# Patient Record
Sex: FEMALE | Race: WHITE | NOT HISPANIC OR LATINO | Employment: OTHER | URBAN - METROPOLITAN AREA
[De-identification: names, ages, dates, MRNs, and addresses within clinical notes are randomized per-mention and may not be internally consistent; named-entity substitution may affect disease eponyms.]

---

## 2017-01-12 ENCOUNTER — FOLLOW UP AND POST INJECTION EVALUATION (OUTPATIENT)
Dept: URBAN - METROPOLITAN AREA CLINIC 26 | Facility: CLINIC | Age: 77
End: 2017-01-12

## 2017-01-12 VITALS — SYSTOLIC BLOOD PRESSURE: 129 MMHG | HEART RATE: 82 BPM | HEIGHT: 55 IN | DIASTOLIC BLOOD PRESSURE: 74 MMHG

## 2017-01-12 DIAGNOSIS — H40.041: ICD-10-CM

## 2017-01-12 DIAGNOSIS — H35.62: ICD-10-CM

## 2017-01-12 DIAGNOSIS — H40.9: ICD-10-CM

## 2017-01-12 DIAGNOSIS — H43.393: ICD-10-CM

## 2017-01-12 DIAGNOSIS — H34.8110: ICD-10-CM

## 2017-01-12 DIAGNOSIS — H02.836: ICD-10-CM

## 2017-01-12 DIAGNOSIS — H35.371: ICD-10-CM

## 2017-01-12 DIAGNOSIS — H02.833: ICD-10-CM

## 2017-01-12 DIAGNOSIS — H47.323: ICD-10-CM

## 2017-01-12 DIAGNOSIS — H35.3132: ICD-10-CM

## 2017-01-12 PROCEDURE — 92014 COMPRE OPH EXAM EST PT 1/>: CPT

## 2017-01-12 PROCEDURE — 92134 CPTRZ OPH DX IMG PST SGM RTA: CPT

## 2017-01-12 PROCEDURE — 2019F DILATED MACUL EXAM DONE: CPT

## 2017-01-12 PROCEDURE — 67028 INJECTION EYE DRUG: CPT

## 2017-01-12 PROCEDURE — 4177F TALK PT/CRGVR RE AREDS PREV: CPT

## 2017-01-12 PROCEDURE — 1036F TOBACCO NON-USER: CPT

## 2017-01-12 ASSESSMENT — TONOMETRY
OD_IOP_MMHG: 17
OS_IOP_MMHG: 12

## 2017-01-12 ASSESSMENT — VISUAL ACUITY
OS_SC: 20/25
OD_SC: 20/200-1

## 2017-02-27 ENCOUNTER — FOLLOW UP AND POST INJECTION EVALUATION (OUTPATIENT)
Dept: URBAN - METROPOLITAN AREA CLINIC 26 | Facility: CLINIC | Age: 77
End: 2017-02-27

## 2017-02-27 DIAGNOSIS — H34.8110: ICD-10-CM

## 2017-02-27 DIAGNOSIS — H47.323: ICD-10-CM

## 2017-02-27 DIAGNOSIS — H02.836: ICD-10-CM

## 2017-02-27 DIAGNOSIS — H35.62: ICD-10-CM

## 2017-02-27 DIAGNOSIS — H35.3132: ICD-10-CM

## 2017-02-27 DIAGNOSIS — H35.371: ICD-10-CM

## 2017-02-27 DIAGNOSIS — H43.393: ICD-10-CM

## 2017-02-27 DIAGNOSIS — H40.9: ICD-10-CM

## 2017-02-27 DIAGNOSIS — H02.833: ICD-10-CM

## 2017-02-27 DIAGNOSIS — H40.041: ICD-10-CM

## 2017-02-27 PROCEDURE — 1036F TOBACCO NON-USER: CPT

## 2017-02-27 PROCEDURE — 2019F DILATED MACUL EXAM DONE: CPT

## 2017-02-27 PROCEDURE — 92014 COMPRE OPH EXAM EST PT 1/>: CPT

## 2017-02-27 PROCEDURE — 4177F TALK PT/CRGVR RE AREDS PREV: CPT

## 2017-02-27 PROCEDURE — 67028 INJECTION EYE DRUG: CPT

## 2017-02-27 PROCEDURE — 92250 FUNDUS PHOTOGRAPHY W/I&R: CPT

## 2017-02-27 ASSESSMENT — TONOMETRY
OD_IOP_MMHG: 11
OS_IOP_MMHG: 12

## 2017-02-27 ASSESSMENT — VISUAL ACUITY
OD_SC: 20/300
OS_SC: 20/25

## 2017-03-30 ENCOUNTER — FOLLOW UP AND POST INJECTION EVALUATION (OUTPATIENT)
Dept: URBAN - METROPOLITAN AREA CLINIC 26 | Facility: CLINIC | Age: 77
End: 2017-03-30

## 2017-03-30 DIAGNOSIS — H40.9: ICD-10-CM

## 2017-03-30 DIAGNOSIS — H47.323: ICD-10-CM

## 2017-03-30 DIAGNOSIS — H43.393: ICD-10-CM

## 2017-03-30 DIAGNOSIS — H34.8110: ICD-10-CM

## 2017-03-30 DIAGNOSIS — H35.371: ICD-10-CM

## 2017-03-30 DIAGNOSIS — H35.3132: ICD-10-CM

## 2017-03-30 DIAGNOSIS — H40.041: ICD-10-CM

## 2017-03-30 DIAGNOSIS — H35.62: ICD-10-CM

## 2017-03-30 PROCEDURE — 92134 CPTRZ OPH DX IMG PST SGM RTA: CPT

## 2017-03-30 PROCEDURE — 67028 INJECTION EYE DRUG: CPT

## 2017-03-30 PROCEDURE — G8427 DOCREV CUR MEDS BY ELIG CLIN: HCPCS

## 2017-03-30 PROCEDURE — 4177F TALK PT/CRGVR RE AREDS PREV: CPT

## 2017-03-30 PROCEDURE — 92014 COMPRE OPH EXAM EST PT 1/>: CPT

## 2017-03-30 PROCEDURE — 1036F TOBACCO NON-USER: CPT

## 2017-03-30 PROCEDURE — 2019F DILATED MACUL EXAM DONE: CPT

## 2017-03-30 ASSESSMENT — TONOMETRY
OS_IOP_MMHG: 10
OD_IOP_MMHG: 14

## 2017-03-30 ASSESSMENT — VISUAL ACUITY
OS_SC: 20/25-2
OD_SC: 20/200

## 2017-05-01 ENCOUNTER — FOLLOW UP AND POST INJECTION EVALUATION (OUTPATIENT)
Dept: URBAN - METROPOLITAN AREA CLINIC 26 | Facility: CLINIC | Age: 77
End: 2017-05-01

## 2017-05-01 VITALS — HEART RATE: 73 BPM | DIASTOLIC BLOOD PRESSURE: 83 MMHG | SYSTOLIC BLOOD PRESSURE: 143 MMHG | HEIGHT: 55 IN

## 2017-05-01 DIAGNOSIS — H35.371: ICD-10-CM

## 2017-05-01 DIAGNOSIS — H40.041: ICD-10-CM

## 2017-05-01 DIAGNOSIS — H43.393: ICD-10-CM

## 2017-05-01 DIAGNOSIS — H34.8110: ICD-10-CM

## 2017-05-01 DIAGNOSIS — H35.62: ICD-10-CM

## 2017-05-01 DIAGNOSIS — H47.323: ICD-10-CM

## 2017-05-01 DIAGNOSIS — H40.9: ICD-10-CM

## 2017-05-01 DIAGNOSIS — H35.3132: ICD-10-CM

## 2017-05-01 PROCEDURE — 1036F TOBACCO NON-USER: CPT

## 2017-05-01 PROCEDURE — 92014 COMPRE OPH EXAM EST PT 1/>: CPT

## 2017-05-01 PROCEDURE — G8427 DOCREV CUR MEDS BY ELIG CLIN: HCPCS

## 2017-05-01 PROCEDURE — 2019F DILATED MACUL EXAM DONE: CPT

## 2017-05-01 PROCEDURE — 4177F TALK PT/CRGVR RE AREDS PREV: CPT

## 2017-05-01 PROCEDURE — 92250 FUNDUS PHOTOGRAPHY W/I&R: CPT

## 2017-05-01 PROCEDURE — 67028 INJECTION EYE DRUG: CPT

## 2017-05-01 ASSESSMENT — VISUAL ACUITY
OS_CC: 20/25-1
OD_CC: 20/400

## 2017-05-01 ASSESSMENT — TONOMETRY
OS_IOP_MMHG: 14
OD_IOP_MMHG: 19

## 2018-01-17 ENCOUNTER — FOLLOW UP AND POST INJECTION EVALUATION (OUTPATIENT)
Dept: URBAN - METROPOLITAN AREA CLINIC 26 | Facility: CLINIC | Age: 78
End: 2018-01-17

## 2018-01-17 VITALS — BODY MASS INDEX: 39.82 KG/M2 | HEIGHT: 65 IN | WEIGHT: 239 LBS

## 2018-01-17 DIAGNOSIS — H35.371: ICD-10-CM

## 2018-01-17 DIAGNOSIS — H35.3132: ICD-10-CM

## 2018-01-17 DIAGNOSIS — H40.041: ICD-10-CM

## 2018-01-17 DIAGNOSIS — H35.62: ICD-10-CM

## 2018-01-17 DIAGNOSIS — H47.323: ICD-10-CM

## 2018-01-17 DIAGNOSIS — H02.836: ICD-10-CM

## 2018-01-17 DIAGNOSIS — H34.8110: ICD-10-CM

## 2018-01-17 DIAGNOSIS — H02.833: ICD-10-CM

## 2018-01-17 DIAGNOSIS — H43.393: ICD-10-CM

## 2018-01-17 DIAGNOSIS — H04.123: ICD-10-CM

## 2018-01-17 DIAGNOSIS — H40.9: ICD-10-CM

## 2018-01-17 PROCEDURE — 92014 COMPRE OPH EXAM EST PT 1/>: CPT

## 2018-01-17 PROCEDURE — 92250 FUNDUS PHOTOGRAPHY W/I&R: CPT

## 2018-01-17 ASSESSMENT — VISUAL ACUITY
OD_SC: 20/100
OS_SC: 20/20-1

## 2018-01-17 ASSESSMENT — TONOMETRY
OD_IOP_MMHG: 16
OS_IOP_MMHG: 14

## 2018-03-06 ENCOUNTER — FOLLOW UP (OUTPATIENT)
Dept: URBAN - METROPOLITAN AREA CLINIC 26 | Facility: CLINIC | Age: 78
End: 2018-03-06

## 2018-03-06 VITALS — SYSTOLIC BLOOD PRESSURE: 118 MMHG | HEIGHT: 55 IN | DIASTOLIC BLOOD PRESSURE: 78 MMHG

## 2018-03-06 DIAGNOSIS — H35.371: ICD-10-CM

## 2018-03-06 DIAGNOSIS — H43.393: ICD-10-CM

## 2018-03-06 DIAGNOSIS — H40.9: ICD-10-CM

## 2018-03-06 DIAGNOSIS — H35.62: ICD-10-CM

## 2018-03-06 DIAGNOSIS — H47.323: ICD-10-CM

## 2018-03-06 DIAGNOSIS — H04.123: ICD-10-CM

## 2018-03-06 DIAGNOSIS — H35.3132: ICD-10-CM

## 2018-03-06 DIAGNOSIS — H02.833: ICD-10-CM

## 2018-03-06 DIAGNOSIS — H02.836: ICD-10-CM

## 2018-03-06 DIAGNOSIS — H34.8110: ICD-10-CM

## 2018-03-06 DIAGNOSIS — H40.041: ICD-10-CM

## 2018-03-06 PROCEDURE — 92014 COMPRE OPH EXAM EST PT 1/>: CPT

## 2018-03-06 PROCEDURE — 67028 INJECTION EYE DRUG: CPT

## 2018-03-06 PROCEDURE — 92134 CPTRZ OPH DX IMG PST SGM RTA: CPT

## 2018-03-06 ASSESSMENT — VISUAL ACUITY
OS_SC: 20/20-2
OD_PH: 20/200+2
OD_SC: 20/400

## 2018-03-06 ASSESSMENT — TONOMETRY
OS_IOP_MMHG: 13
OD_IOP_MMHG: 15

## 2018-03-20 ENCOUNTER — IOP CHECK (OUTPATIENT)
Dept: URBAN - METROPOLITAN AREA CLINIC 26 | Facility: CLINIC | Age: 78
End: 2018-03-20

## 2018-03-20 DIAGNOSIS — H47.323: ICD-10-CM

## 2018-03-20 DIAGNOSIS — H35.371: ICD-10-CM

## 2018-03-20 DIAGNOSIS — H35.62: ICD-10-CM

## 2018-03-20 DIAGNOSIS — H04.123: ICD-10-CM

## 2018-03-20 DIAGNOSIS — H43.393: ICD-10-CM

## 2018-03-20 DIAGNOSIS — H02.833: ICD-10-CM

## 2018-03-20 DIAGNOSIS — H34.8110: ICD-10-CM

## 2018-03-20 DIAGNOSIS — H35.3132: ICD-10-CM

## 2018-03-20 DIAGNOSIS — H40.041: ICD-10-CM

## 2018-03-20 DIAGNOSIS — H40.9: ICD-10-CM

## 2018-03-20 DIAGNOSIS — H02.836: ICD-10-CM

## 2018-03-20 PROCEDURE — 99211 OFF/OP EST MAY X REQ PHY/QHP: CPT

## 2018-03-20 ASSESSMENT — VISUAL ACUITY
OS_SC: 20/20-
OD_PH: 20/100
OD_SC: 20/200

## 2018-03-20 ASSESSMENT — TONOMETRY
OS_IOP_MMHG: 12
OD_IOP_MMHG: 10

## 2018-04-18 ENCOUNTER — FOLLOW UP (OUTPATIENT)
Dept: URBAN - METROPOLITAN AREA CLINIC 26 | Facility: CLINIC | Age: 78
End: 2018-04-18

## 2018-04-18 VITALS — DIASTOLIC BLOOD PRESSURE: 71 MMHG | SYSTOLIC BLOOD PRESSURE: 116 MMHG | HEART RATE: 72 BPM | HEIGHT: 55 IN

## 2018-04-18 DIAGNOSIS — H35.3132: ICD-10-CM

## 2018-04-18 DIAGNOSIS — H02.833: ICD-10-CM

## 2018-04-18 DIAGNOSIS — H35.371: ICD-10-CM

## 2018-04-18 DIAGNOSIS — H34.8110: ICD-10-CM

## 2018-04-18 DIAGNOSIS — H47.323: ICD-10-CM

## 2018-04-18 DIAGNOSIS — H40.9: ICD-10-CM

## 2018-04-18 DIAGNOSIS — H43.393: ICD-10-CM

## 2018-04-18 DIAGNOSIS — H35.62: ICD-10-CM

## 2018-04-18 DIAGNOSIS — H02.836: ICD-10-CM

## 2018-04-18 DIAGNOSIS — H04.123: ICD-10-CM

## 2018-04-18 DIAGNOSIS — H40.041: ICD-10-CM

## 2018-04-18 PROCEDURE — 67028 INJECTION EYE DRUG: CPT

## 2018-04-18 PROCEDURE — 92014 COMPRE OPH EXAM EST PT 1/>: CPT

## 2018-04-18 PROCEDURE — 92250 FUNDUS PHOTOGRAPHY W/I&R: CPT

## 2018-04-18 ASSESSMENT — VISUAL ACUITY
OS_SC: 20/20-1
OD_SC: 20/200+1

## 2018-04-18 ASSESSMENT — TONOMETRY
OS_IOP_MMHG: 13
OD_IOP_MMHG: 20

## 2018-08-28 ENCOUNTER — FOLLOW UP (OUTPATIENT)
Dept: URBAN - METROPOLITAN AREA CLINIC 26 | Facility: CLINIC | Age: 78
End: 2018-08-28

## 2018-08-28 VITALS — SYSTOLIC BLOOD PRESSURE: 123 MMHG | HEIGHT: 55 IN | HEART RATE: 66 BPM | DIASTOLIC BLOOD PRESSURE: 74 MMHG

## 2018-08-28 DIAGNOSIS — H35.3132: ICD-10-CM

## 2018-08-28 DIAGNOSIS — H40.041: ICD-10-CM

## 2018-08-28 DIAGNOSIS — H35.371: ICD-10-CM

## 2018-08-28 DIAGNOSIS — H35.62: ICD-10-CM

## 2018-08-28 DIAGNOSIS — H40.9: ICD-10-CM

## 2018-08-28 DIAGNOSIS — H34.8322: ICD-10-CM

## 2018-08-28 DIAGNOSIS — H47.323: ICD-10-CM

## 2018-08-28 DIAGNOSIS — H34.8110: ICD-10-CM

## 2018-08-28 DIAGNOSIS — H43.393: ICD-10-CM

## 2018-08-28 PROCEDURE — 67028 INJECTION EYE DRUG: CPT

## 2018-08-28 PROCEDURE — 92250 FUNDUS PHOTOGRAPHY W/I&R: CPT

## 2018-08-28 PROCEDURE — 92014 COMPRE OPH EXAM EST PT 1/>: CPT

## 2018-08-28 ASSESSMENT — VISUAL ACUITY
OD_SC: 20/200
OS_SC: 20/20-2

## 2018-08-28 ASSESSMENT — TONOMETRY
OD_IOP_MMHG: 15
OS_IOP_MMHG: 19

## 2018-10-04 ENCOUNTER — CLINICAL PROCEDURE AND DIAGNOSTIC TESTING ONLY (OUTPATIENT)
Dept: URBAN - METROPOLITAN AREA CLINIC 26 | Facility: CLINIC | Age: 78
End: 2018-10-04

## 2018-10-04 DIAGNOSIS — H34.8110: ICD-10-CM

## 2018-10-04 DIAGNOSIS — H34.8322: ICD-10-CM

## 2018-10-04 PROCEDURE — 92134 CPTRZ OPH DX IMG PST SGM RTA: CPT

## 2018-10-04 PROCEDURE — 67028 INJECTION EYE DRUG: CPT

## 2018-10-04 ASSESSMENT — VISUAL ACUITY: OD_SC: 20/200

## 2018-10-04 ASSESSMENT — TONOMETRY: OD_IOP_MMHG: 10

## 2018-11-05 ENCOUNTER — FOLLOW UP AND POST INJECTION EVALUATION (OUTPATIENT)
Dept: URBAN - METROPOLITAN AREA CLINIC 26 | Facility: CLINIC | Age: 78
End: 2018-11-05

## 2018-11-05 DIAGNOSIS — H35.371: ICD-10-CM

## 2018-11-05 DIAGNOSIS — H04.123: ICD-10-CM

## 2018-11-05 DIAGNOSIS — H35.3132: ICD-10-CM

## 2018-11-05 DIAGNOSIS — H34.8110: ICD-10-CM

## 2018-11-05 DIAGNOSIS — H35.62: ICD-10-CM

## 2018-11-05 DIAGNOSIS — H47.323: ICD-10-CM

## 2018-11-05 DIAGNOSIS — H02.836: ICD-10-CM

## 2018-11-05 DIAGNOSIS — H02.833: ICD-10-CM

## 2018-11-05 DIAGNOSIS — H43.393: ICD-10-CM

## 2018-11-05 DIAGNOSIS — H34.8322: ICD-10-CM

## 2018-11-05 DIAGNOSIS — H40.9: ICD-10-CM

## 2018-11-05 DIAGNOSIS — H40.041: ICD-10-CM

## 2018-11-05 PROCEDURE — 92014 COMPRE OPH EXAM EST PT 1/>: CPT

## 2018-11-05 PROCEDURE — 92250 FUNDUS PHOTOGRAPHY W/I&R: CPT

## 2018-11-05 PROCEDURE — 92134 CPTRZ OPH DX IMG PST SGM RTA: CPT

## 2018-11-05 PROCEDURE — 67028 INJECTION EYE DRUG: CPT

## 2018-11-05 ASSESSMENT — VISUAL ACUITY
OS_SC: 20/25+1
OD_SC: 20/400

## 2018-11-05 ASSESSMENT — TONOMETRY
OD_IOP_MMHG: 8
OS_IOP_MMHG: 10

## 2018-12-10 ENCOUNTER — CLINICAL PROCEDURE AND DIAGNOSTIC TESTING ONLY (OUTPATIENT)
Dept: URBAN - METROPOLITAN AREA CLINIC 26 | Facility: CLINIC | Age: 78
End: 2018-12-10

## 2018-12-10 DIAGNOSIS — H34.8110: ICD-10-CM

## 2018-12-10 DIAGNOSIS — H47.323: ICD-10-CM

## 2018-12-10 PROCEDURE — 67028 INJECTION EYE DRUG: CPT

## 2018-12-10 PROCEDURE — 92250 FUNDUS PHOTOGRAPHY W/I&R: CPT

## 2018-12-10 ASSESSMENT — TONOMETRY: OD_IOP_MMHG: 15

## 2018-12-10 ASSESSMENT — VISUAL ACUITY: OD_SC: 20/400

## 2019-01-14 ENCOUNTER — FOLLOW UP AND POST INJECTION EVALUATION (OUTPATIENT)
Dept: URBAN - METROPOLITAN AREA CLINIC 26 | Facility: CLINIC | Age: 79
End: 2019-01-14

## 2019-01-14 VITALS — WEIGHT: 230 LBS | BODY MASS INDEX: 38.32 KG/M2 | HEIGHT: 65 IN

## 2019-01-14 DIAGNOSIS — H34.8322: ICD-10-CM

## 2019-01-14 DIAGNOSIS — H34.8110: ICD-10-CM

## 2019-01-14 DIAGNOSIS — H47.323: ICD-10-CM

## 2019-01-14 PROCEDURE — 92250 FUNDUS PHOTOGRAPHY W/I&R: CPT

## 2019-01-14 PROCEDURE — 92134 CPTRZ OPH DX IMG PST SGM RTA: CPT

## 2019-01-14 PROCEDURE — 67028 INJECTION EYE DRUG: CPT

## 2019-01-14 ASSESSMENT — TONOMETRY
OD_IOP_MMHG: 18
OS_IOP_MMHG: 15
OD_IOP_MMHG: 15
OD_IOP_MMHG: 38

## 2019-01-14 ASSESSMENT — VISUAL ACUITY
OD_SC: 20/200
OS_SC: 20/20-1

## 2019-02-13 ENCOUNTER — CLINICAL PROCEDURE AND DIAGNOSTIC TESTING ONLY (OUTPATIENT)
Dept: URBAN - METROPOLITAN AREA CLINIC 26 | Facility: CLINIC | Age: 79
End: 2019-02-13

## 2019-02-13 DIAGNOSIS — H34.8110: ICD-10-CM

## 2019-02-13 DIAGNOSIS — H34.8322: ICD-10-CM

## 2019-02-13 PROCEDURE — 92134 CPTRZ OPH DX IMG PST SGM RTA: CPT

## 2019-02-13 PROCEDURE — 67028 INJECTION EYE DRUG: CPT

## 2019-02-13 ASSESSMENT — TONOMETRY: OD_IOP_MMHG: 11

## 2019-02-13 ASSESSMENT — VISUAL ACUITY: OD_SC: 20/200+1

## 2019-03-25 ENCOUNTER — CLINICAL PROCEDURE AND DIAGNOSTIC TESTING ONLY (OUTPATIENT)
Dept: URBAN - METROPOLITAN AREA CLINIC 26 | Facility: CLINIC | Age: 79
End: 2019-03-25

## 2019-03-25 DIAGNOSIS — H34.8110: ICD-10-CM

## 2019-03-25 DIAGNOSIS — H47.323: ICD-10-CM

## 2019-03-25 PROCEDURE — 92250 FUNDUS PHOTOGRAPHY W/I&R: CPT

## 2019-03-25 PROCEDURE — 67028 INJECTION EYE DRUG: CPT

## 2019-03-25 ASSESSMENT — VISUAL ACUITY: OD_SC: 20/200-1

## 2019-03-25 ASSESSMENT — TONOMETRY: OD_IOP_MMHG: 13

## 2019-04-29 ENCOUNTER — CLINICAL PROCEDURE AND DIAGNOSTIC TESTING ONLY (OUTPATIENT)
Dept: URBAN - METROPOLITAN AREA CLINIC 26 | Facility: CLINIC | Age: 79
End: 2019-04-29

## 2019-04-29 DIAGNOSIS — H34.8322: ICD-10-CM

## 2019-04-29 DIAGNOSIS — H34.8110: ICD-10-CM

## 2019-04-29 PROCEDURE — 92134 CPTRZ OPH DX IMG PST SGM RTA: CPT

## 2019-04-29 PROCEDURE — 67028 INJECTION EYE DRUG: CPT

## 2019-04-29 ASSESSMENT — VISUAL ACUITY
OS_SC: 20/20-
OD_SC: 20/200

## 2019-04-29 ASSESSMENT — TONOMETRY: OD_IOP_MMHG: 11

## 2019-05-30 ENCOUNTER — FOLLOW UP AND POST INJECTION EVALUATION (OUTPATIENT)
Dept: URBAN - METROPOLITAN AREA CLINIC 26 | Facility: CLINIC | Age: 79
End: 2019-05-30

## 2019-05-30 DIAGNOSIS — H40.041: ICD-10-CM

## 2019-05-30 DIAGNOSIS — H34.8322: ICD-10-CM

## 2019-05-30 DIAGNOSIS — H40.9: ICD-10-CM

## 2019-05-30 DIAGNOSIS — H35.62: ICD-10-CM

## 2019-05-30 DIAGNOSIS — H35.3132: ICD-10-CM

## 2019-05-30 DIAGNOSIS — H43.393: ICD-10-CM

## 2019-05-30 DIAGNOSIS — H34.8110: ICD-10-CM

## 2019-05-30 DIAGNOSIS — H35.371: ICD-10-CM

## 2019-05-30 DIAGNOSIS — H47.323: ICD-10-CM

## 2019-05-30 PROCEDURE — 92014 COMPRE OPH EXAM EST PT 1/>: CPT

## 2019-05-30 PROCEDURE — 92250 FUNDUS PHOTOGRAPHY W/I&R: CPT

## 2019-05-30 PROCEDURE — 92134 CPTRZ OPH DX IMG PST SGM RTA: CPT

## 2019-05-30 PROCEDURE — 67028 INJECTION EYE DRUG: CPT

## 2019-05-30 ASSESSMENT — TONOMETRY
OD_IOP_MMHG: 12
OS_IOP_MMHG: 14

## 2019-05-30 ASSESSMENT — VISUAL ACUITY
OD_SC: 20/200-1
OS_SC: 20/25+2

## 2019-06-11 ENCOUNTER — IOP CHECK (OUTPATIENT)
Dept: URBAN - METROPOLITAN AREA CLINIC 26 | Facility: CLINIC | Age: 79
End: 2019-06-11

## 2019-06-11 DIAGNOSIS — H35.3132: ICD-10-CM

## 2019-06-11 DIAGNOSIS — H35.371: ICD-10-CM

## 2019-06-11 DIAGNOSIS — H40.041: ICD-10-CM

## 2019-06-11 DIAGNOSIS — H40.9: ICD-10-CM

## 2019-06-11 DIAGNOSIS — H43.393: ICD-10-CM

## 2019-06-11 DIAGNOSIS — H35.62: ICD-10-CM

## 2019-06-11 DIAGNOSIS — H34.8110: ICD-10-CM

## 2019-06-11 DIAGNOSIS — H47.323: ICD-10-CM

## 2019-06-11 DIAGNOSIS — H34.8322: ICD-10-CM

## 2019-06-11 PROCEDURE — 99211 OFF/OP EST MAY X REQ PHY/QHP: CPT

## 2019-06-11 ASSESSMENT — TONOMETRY
OS_IOP_MMHG: 15
OD_IOP_MMHG: 15

## 2019-06-11 ASSESSMENT — VISUAL ACUITY
OS_SC: 20/30
OD_SC: 20/200

## 2019-07-02 ENCOUNTER — FOLLOW UP AND POST INJECTION EVALUATION (OUTPATIENT)
Dept: URBAN - METROPOLITAN AREA CLINIC 26 | Facility: CLINIC | Age: 79
End: 2019-07-02

## 2019-07-02 DIAGNOSIS — H47.323: ICD-10-CM

## 2019-07-02 DIAGNOSIS — H43.393: ICD-10-CM

## 2019-07-02 DIAGNOSIS — H34.8322: ICD-10-CM

## 2019-07-02 DIAGNOSIS — H40.9: ICD-10-CM

## 2019-07-02 DIAGNOSIS — H40.041: ICD-10-CM

## 2019-07-02 DIAGNOSIS — H04.123: ICD-10-CM

## 2019-07-02 DIAGNOSIS — H34.8110: ICD-10-CM

## 2019-07-02 DIAGNOSIS — H35.62: ICD-10-CM

## 2019-07-02 DIAGNOSIS — H35.3132: ICD-10-CM

## 2019-07-02 DIAGNOSIS — H02.833: ICD-10-CM

## 2019-07-02 DIAGNOSIS — H02.836: ICD-10-CM

## 2019-07-02 DIAGNOSIS — H35.371: ICD-10-CM

## 2019-07-02 PROCEDURE — 92014 COMPRE OPH EXAM EST PT 1/>: CPT

## 2019-07-02 PROCEDURE — 67028 INJECTION EYE DRUG: CPT

## 2019-07-02 PROCEDURE — 92134 CPTRZ OPH DX IMG PST SGM RTA: CPT

## 2019-07-02 PROCEDURE — 92250 FUNDUS PHOTOGRAPHY W/I&R: CPT

## 2019-07-02 ASSESSMENT — VISUAL ACUITY
OD_SC: 20/200
OS_SC: 20/25
OD_PH: 20/200+1

## 2019-07-02 ASSESSMENT — TONOMETRY
OD_IOP_MMHG: 14
OS_IOP_MMHG: 14

## 2019-08-06 ENCOUNTER — CLINICAL PROCEDURE AND DIAGNOSTIC TESTING ONLY (OUTPATIENT)
Dept: URBAN - METROPOLITAN AREA CLINIC 26 | Facility: CLINIC | Age: 79
End: 2019-08-06

## 2019-08-06 DIAGNOSIS — H47.323: ICD-10-CM

## 2019-08-06 DIAGNOSIS — H34.8110: ICD-10-CM

## 2019-08-06 PROCEDURE — 67028 INJECTION EYE DRUG: CPT

## 2019-08-06 PROCEDURE — 92250 FUNDUS PHOTOGRAPHY W/I&R: CPT

## 2019-08-06 ASSESSMENT — TONOMETRY: OD_IOP_MMHG: 10

## 2019-08-06 ASSESSMENT — VISUAL ACUITY: OD_SC: 20/80

## 2019-09-10 ENCOUNTER — FOLLOW UP AND POST INJECTION EVALUATION (OUTPATIENT)
Dept: URBAN - METROPOLITAN AREA CLINIC 26 | Facility: CLINIC | Age: 79
End: 2019-09-10

## 2019-09-10 DIAGNOSIS — H02.836: ICD-10-CM

## 2019-09-10 DIAGNOSIS — H43.393: ICD-10-CM

## 2019-09-10 DIAGNOSIS — H57.11: ICD-10-CM

## 2019-09-10 DIAGNOSIS — H35.371: ICD-10-CM

## 2019-09-10 DIAGNOSIS — H34.8322: ICD-10-CM

## 2019-09-10 DIAGNOSIS — H47.323: ICD-10-CM

## 2019-09-10 DIAGNOSIS — H34.8110: ICD-10-CM

## 2019-09-10 DIAGNOSIS — H35.3132: ICD-10-CM

## 2019-09-10 DIAGNOSIS — H40.041: ICD-10-CM

## 2019-09-10 DIAGNOSIS — H02.833: ICD-10-CM

## 2019-09-10 DIAGNOSIS — H40.9: ICD-10-CM

## 2019-09-10 DIAGNOSIS — H04.123: ICD-10-CM

## 2019-09-10 DIAGNOSIS — H35.62: ICD-10-CM

## 2019-09-10 PROCEDURE — 92250 FUNDUS PHOTOGRAPHY W/I&R: CPT

## 2019-09-10 PROCEDURE — 92014 COMPRE OPH EXAM EST PT 1/>: CPT

## 2019-09-10 PROCEDURE — 67028 INJECTION EYE DRUG: CPT

## 2019-09-10 PROCEDURE — 92134 CPTRZ OPH DX IMG PST SGM RTA: CPT

## 2019-09-10 ASSESSMENT — VISUAL ACUITY
OD_SC: 20/200
OS_SC: 20/25+1

## 2019-09-10 ASSESSMENT — TONOMETRY
OD_IOP_MMHG: 10
OS_IOP_MMHG: 11

## 2019-10-10 ENCOUNTER — CLINICAL PROCEDURE AND DIAGNOSTIC TESTING ONLY (OUTPATIENT)
Dept: URBAN - METROPOLITAN AREA CLINIC 26 | Facility: CLINIC | Age: 79
End: 2019-10-10

## 2019-10-10 DIAGNOSIS — H34.8110: ICD-10-CM

## 2019-10-10 DIAGNOSIS — H47.323: ICD-10-CM

## 2019-10-10 PROCEDURE — 67028 INJECTION EYE DRUG: CPT

## 2019-10-10 PROCEDURE — 92250 FUNDUS PHOTOGRAPHY W/I&R: CPT

## 2019-10-10 ASSESSMENT — VISUAL ACUITY: OD_SC: 20/200

## 2019-10-10 ASSESSMENT — TONOMETRY: OD_IOP_MMHG: 13

## 2019-11-11 ENCOUNTER — CLINICAL PROCEDURE AND DIAGNOSTIC TESTING ONLY (OUTPATIENT)
Dept: URBAN - METROPOLITAN AREA CLINIC 26 | Facility: CLINIC | Age: 79
End: 2019-11-11

## 2019-11-11 DIAGNOSIS — H34.8322: ICD-10-CM

## 2019-11-11 DIAGNOSIS — H34.8110: ICD-10-CM

## 2019-11-11 PROCEDURE — 67028 INJECTION EYE DRUG: CPT

## 2019-11-11 PROCEDURE — 92134 CPTRZ OPH DX IMG PST SGM RTA: CPT

## 2019-11-11 ASSESSMENT — VISUAL ACUITY: OD_SC: 20/200

## 2019-11-11 ASSESSMENT — TONOMETRY: OD_IOP_MMHG: 11

## 2019-11-19 ENCOUNTER — ADDENDUM (OUTPATIENT)
Dept: URBAN - METROPOLITAN AREA CLINIC 26 | Facility: CLINIC | Age: 79
End: 2019-11-19

## 2019-12-11 ENCOUNTER — CLINICAL PROCEDURE AND DIAGNOSTIC TESTING ONLY (OUTPATIENT)
Dept: URBAN - METROPOLITAN AREA CLINIC 26 | Facility: CLINIC | Age: 79
End: 2019-12-11

## 2019-12-11 DIAGNOSIS — H34.8322: ICD-10-CM

## 2019-12-11 DIAGNOSIS — H34.8110: ICD-10-CM

## 2019-12-11 PROCEDURE — 92250 FUNDUS PHOTOGRAPHY W/I&R: CPT

## 2019-12-11 PROCEDURE — 67028 INJECTION EYE DRUG: CPT

## 2019-12-11 ASSESSMENT — VISUAL ACUITY: OD_SC: 20/200

## 2019-12-11 ASSESSMENT — TONOMETRY: OD_IOP_MMHG: 12

## 2020-01-27 ENCOUNTER — FOLLOW UP (OUTPATIENT)
Dept: URBAN - METROPOLITAN AREA CLINIC 26 | Facility: CLINIC | Age: 80
End: 2020-01-27

## 2020-01-27 DIAGNOSIS — H53.8: ICD-10-CM

## 2020-01-27 DIAGNOSIS — H34.8110: ICD-10-CM

## 2020-01-27 DIAGNOSIS — H35.62: ICD-10-CM

## 2020-01-27 DIAGNOSIS — G45.3: ICD-10-CM

## 2020-01-27 DIAGNOSIS — H04.123: ICD-10-CM

## 2020-01-27 DIAGNOSIS — H40.041: ICD-10-CM

## 2020-01-27 DIAGNOSIS — H34.8322: ICD-10-CM

## 2020-01-27 DIAGNOSIS — H35.371: ICD-10-CM

## 2020-01-27 DIAGNOSIS — H02.833: ICD-10-CM

## 2020-01-27 DIAGNOSIS — H40.9: ICD-10-CM

## 2020-01-27 DIAGNOSIS — H43.393: ICD-10-CM

## 2020-01-27 DIAGNOSIS — H02.836: ICD-10-CM

## 2020-01-27 DIAGNOSIS — H57.11: ICD-10-CM

## 2020-01-27 DIAGNOSIS — H47.323: ICD-10-CM

## 2020-01-27 DIAGNOSIS — H35.3132: ICD-10-CM

## 2020-01-27 PROCEDURE — 92014 COMPRE OPH EXAM EST PT 1/>: CPT

## 2020-01-27 PROCEDURE — 92250 FUNDUS PHOTOGRAPHY W/I&R: CPT

## 2020-01-27 PROCEDURE — 92134 CPTRZ OPH DX IMG PST SGM RTA: CPT

## 2020-01-27 PROCEDURE — 67028 INJECTION EYE DRUG: CPT

## 2020-01-27 ASSESSMENT — VISUAL ACUITY
OS_SC: 20/25
OD_SC: 20/400

## 2020-01-27 ASSESSMENT — TONOMETRY
OS_IOP_MMHG: 12
OD_IOP_MMHG: 14

## 2020-02-25 ENCOUNTER — CLINIC PROCEDURE ONLY (OUTPATIENT)
Dept: URBAN - METROPOLITAN AREA CLINIC 26 | Facility: CLINIC | Age: 80
End: 2020-02-25

## 2020-02-25 DIAGNOSIS — H34.8322: ICD-10-CM

## 2020-02-25 DIAGNOSIS — H34.8110: ICD-10-CM

## 2020-02-25 PROCEDURE — 67028 INJECTION EYE DRUG: CPT

## 2020-02-25 PROCEDURE — 92134 CPTRZ OPH DX IMG PST SGM RTA: CPT

## 2020-02-25 ASSESSMENT — VISUAL ACUITY: OD_SC: 20/200-2

## 2020-02-25 ASSESSMENT — TONOMETRY: OD_IOP_MMHG: 11

## 2020-03-25 ENCOUNTER — CLINICAL PROCEDURE AND DIAGNOSTIC TESTING ONLY (OUTPATIENT)
Dept: URBAN - METROPOLITAN AREA CLINIC 26 | Facility: CLINIC | Age: 80
End: 2020-03-25

## 2020-03-25 DIAGNOSIS — H34.8322: ICD-10-CM

## 2020-03-25 DIAGNOSIS — H34.8110: ICD-10-CM

## 2020-03-25 PROCEDURE — 67028 INJECTION EYE DRUG: CPT

## 2020-03-25 PROCEDURE — 92250 FUNDUS PHOTOGRAPHY W/I&R: CPT

## 2020-03-25 ASSESSMENT — VISUAL ACUITY: OD_SC: 20/200-1

## 2020-03-25 ASSESSMENT — TONOMETRY: OD_IOP_MMHG: 10

## 2020-04-23 ENCOUNTER — CLINICAL PROCEDURE AND DIAGNOSTIC TESTING ONLY (OUTPATIENT)
Dept: URBAN - METROPOLITAN AREA CLINIC 26 | Facility: CLINIC | Age: 80
End: 2020-04-23

## 2020-04-23 DIAGNOSIS — H34.8322: ICD-10-CM

## 2020-04-23 DIAGNOSIS — H34.8110: ICD-10-CM

## 2020-04-23 PROCEDURE — 67028 INJECTION EYE DRUG: CPT

## 2020-04-23 PROCEDURE — 92134 CPTRZ OPH DX IMG PST SGM RTA: CPT

## 2020-04-23 ASSESSMENT — VISUAL ACUITY: OD_SC: 20/200-1

## 2020-04-23 ASSESSMENT — TONOMETRY: OD_IOP_MMHG: 17

## 2020-05-26 ENCOUNTER — CLINICAL PROCEDURE AND DIAGNOSTIC TESTING ONLY (OUTPATIENT)
Dept: URBAN - METROPOLITAN AREA CLINIC 26 | Facility: CLINIC | Age: 80
End: 2020-05-26

## 2020-05-26 DIAGNOSIS — H35.3132: ICD-10-CM

## 2020-05-26 DIAGNOSIS — H34.8110: ICD-10-CM

## 2020-05-26 PROCEDURE — 67028 INJECTION EYE DRUG: CPT

## 2020-05-26 PROCEDURE — 92250 FUNDUS PHOTOGRAPHY W/I&R: CPT

## 2020-05-26 ASSESSMENT — VISUAL ACUITY: OD_SC: 20/200

## 2020-05-26 ASSESSMENT — TONOMETRY: OD_IOP_MMHG: 14

## 2020-06-24 ENCOUNTER — FOLLOW UP AND POST INJECTION EVALUATION (OUTPATIENT)
Dept: URBAN - METROPOLITAN AREA CLINIC 26 | Facility: CLINIC | Age: 80
End: 2020-06-24

## 2020-06-24 DIAGNOSIS — H35.3132: ICD-10-CM

## 2020-06-24 DIAGNOSIS — H47.323: ICD-10-CM

## 2020-06-24 DIAGNOSIS — H34.8322: ICD-10-CM

## 2020-06-24 DIAGNOSIS — H53.8: ICD-10-CM

## 2020-06-24 DIAGNOSIS — H35.62: ICD-10-CM

## 2020-06-24 DIAGNOSIS — H34.8110: ICD-10-CM

## 2020-06-24 DIAGNOSIS — H43.393: ICD-10-CM

## 2020-06-24 DIAGNOSIS — H35.371: ICD-10-CM

## 2020-06-24 DIAGNOSIS — H40.041: ICD-10-CM

## 2020-06-24 DIAGNOSIS — G45.3: ICD-10-CM

## 2020-06-24 DIAGNOSIS — H40.9: ICD-10-CM

## 2020-06-24 PROCEDURE — 67028 INJECTION EYE DRUG: CPT

## 2020-06-24 PROCEDURE — 92250 FUNDUS PHOTOGRAPHY W/I&R: CPT

## 2020-06-24 PROCEDURE — 92014 COMPRE OPH EXAM EST PT 1/>: CPT

## 2020-06-24 PROCEDURE — 92134 CPTRZ OPH DX IMG PST SGM RTA: CPT

## 2020-06-24 ASSESSMENT — VISUAL ACUITY
OD_SC: 20/200-2
OS_SC: 20/25+2

## 2020-06-24 ASSESSMENT — TONOMETRY
OS_IOP_MMHG: 13
OD_IOP_MMHG: 11
OD_IOP_MMHG: 32

## 2020-07-08 ENCOUNTER — IOP CHECK (OUTPATIENT)
Dept: URBAN - METROPOLITAN AREA CLINIC 26 | Facility: CLINIC | Age: 80
End: 2020-07-08

## 2020-07-08 DIAGNOSIS — H40.041: ICD-10-CM

## 2020-07-08 DIAGNOSIS — H34.8110: ICD-10-CM

## 2020-07-08 DIAGNOSIS — H35.371: ICD-10-CM

## 2020-07-08 DIAGNOSIS — H47.323: ICD-10-CM

## 2020-07-08 DIAGNOSIS — H43.393: ICD-10-CM

## 2020-07-08 DIAGNOSIS — H34.8322: ICD-10-CM

## 2020-07-08 DIAGNOSIS — H35.3132: ICD-10-CM

## 2020-07-08 DIAGNOSIS — H35.62: ICD-10-CM

## 2020-07-08 DIAGNOSIS — H40.9: ICD-10-CM

## 2020-07-08 PROCEDURE — 99211 OFF/OP EST MAY X REQ PHY/QHP: CPT

## 2020-07-08 ASSESSMENT — TONOMETRY: OD_IOP_MMHG: 14

## 2020-07-08 ASSESSMENT — VISUAL ACUITY: OD_SC: 20/200

## 2020-07-30 ENCOUNTER — FOLLOW UP (OUTPATIENT)
Dept: URBAN - METROPOLITAN AREA CLINIC 26 | Facility: CLINIC | Age: 80
End: 2020-07-30

## 2020-07-30 VITALS — BODY MASS INDEX: 39.65 KG/M2 | WEIGHT: 238 LBS | HEIGHT: 65 IN

## 2020-07-30 DIAGNOSIS — H35.371: ICD-10-CM

## 2020-07-30 DIAGNOSIS — H40.9: ICD-10-CM

## 2020-07-30 DIAGNOSIS — H34.8322: ICD-10-CM

## 2020-07-30 DIAGNOSIS — H35.62: ICD-10-CM

## 2020-07-30 DIAGNOSIS — H40.041: ICD-10-CM

## 2020-07-30 DIAGNOSIS — H35.3132: ICD-10-CM

## 2020-07-30 DIAGNOSIS — G45.3: ICD-10-CM

## 2020-07-30 DIAGNOSIS — H43.393: ICD-10-CM

## 2020-07-30 DIAGNOSIS — H53.8: ICD-10-CM

## 2020-07-30 DIAGNOSIS — H34.8110: ICD-10-CM

## 2020-07-30 DIAGNOSIS — H47.323: ICD-10-CM

## 2020-07-30 PROCEDURE — 92134 CPTRZ OPH DX IMG PST SGM RTA: CPT

## 2020-07-30 PROCEDURE — 92250 FUNDUS PHOTOGRAPHY W/I&R: CPT

## 2020-07-30 PROCEDURE — 67028 INJECTION EYE DRUG: CPT

## 2020-07-30 PROCEDURE — 92014 COMPRE OPH EXAM EST PT 1/>: CPT

## 2020-07-30 ASSESSMENT — VISUAL ACUITY
OD_PH: 20/200+1
OD_SC: 20/400
OS_SC: 20/20-2

## 2020-07-30 ASSESSMENT — TONOMETRY
OD_IOP_MMHG: 12
OS_IOP_MMHG: 14

## 2020-08-31 ENCOUNTER — CLINICAL PROCEDURE AND DIAGNOSTIC TESTING ONLY (OUTPATIENT)
Dept: URBAN - METROPOLITAN AREA CLINIC 26 | Facility: CLINIC | Age: 80
End: 2020-08-31

## 2020-08-31 DIAGNOSIS — H34.8322: ICD-10-CM

## 2020-08-31 DIAGNOSIS — H34.8110: ICD-10-CM

## 2020-08-31 PROCEDURE — 92134 CPTRZ OPH DX IMG PST SGM RTA: CPT

## 2020-08-31 PROCEDURE — 67028 INJECTION EYE DRUG: CPT

## 2020-08-31 ASSESSMENT — VISUAL ACUITY
OD_PH: 20/200
OD_SC: 20/400

## 2020-08-31 ASSESSMENT — TONOMETRY: OD_IOP_MMHG: 13

## 2020-09-29 ENCOUNTER — CLINICAL PROCEDURE AND DIAGNOSTIC TESTING ONLY (OUTPATIENT)
Dept: URBAN - METROPOLITAN AREA CLINIC 26 | Facility: CLINIC | Age: 80
End: 2020-09-29

## 2020-09-29 DIAGNOSIS — H47.323: ICD-10-CM

## 2020-09-29 DIAGNOSIS — H35.371: ICD-10-CM

## 2020-09-29 DIAGNOSIS — H34.8110: ICD-10-CM

## 2020-09-29 PROCEDURE — 92250 FUNDUS PHOTOGRAPHY W/I&R: CPT

## 2020-09-29 PROCEDURE — 67028 INJECTION EYE DRUG: CPT

## 2020-09-29 ASSESSMENT — VISUAL ACUITY: OD_SC: 20/200+1

## 2020-09-29 ASSESSMENT — TONOMETRY: OD_IOP_MMHG: 14

## 2020-10-28 ENCOUNTER — CLINICAL PROCEDURE AND DIAGNOSTIC TESTING ONLY (OUTPATIENT)
Dept: URBAN - METROPOLITAN AREA CLINIC 26 | Facility: CLINIC | Age: 80
End: 2020-10-28

## 2020-10-28 DIAGNOSIS — H34.8110: ICD-10-CM

## 2020-10-28 DIAGNOSIS — H34.8322: ICD-10-CM

## 2020-10-28 PROCEDURE — 67028 INJECTION EYE DRUG: CPT

## 2020-10-28 PROCEDURE — 92134 CPTRZ OPH DX IMG PST SGM RTA: CPT

## 2020-10-28 ASSESSMENT — VISUAL ACUITY: OD_SC: 20/200-1

## 2020-10-28 ASSESSMENT — TONOMETRY: OD_IOP_MMHG: 08

## 2020-11-30 ENCOUNTER — CLINICAL PROCEDURE AND DIAGNOSTIC TESTING ONLY (OUTPATIENT)
Dept: URBAN - METROPOLITAN AREA CLINIC 26 | Facility: CLINIC | Age: 80
End: 2020-11-30

## 2020-11-30 DIAGNOSIS — H34.8110: ICD-10-CM

## 2020-11-30 DIAGNOSIS — H35.371: ICD-10-CM

## 2020-11-30 PROCEDURE — 67028 INJECTION EYE DRUG: CPT

## 2020-11-30 PROCEDURE — 92250 FUNDUS PHOTOGRAPHY W/I&R: CPT

## 2020-11-30 ASSESSMENT — TONOMETRY: OD_IOP_MMHG: 12

## 2020-11-30 ASSESSMENT — VISUAL ACUITY: OD_SC: 20/200-1

## 2020-12-01 ENCOUNTER — OFFICE VISIT (OUTPATIENT)
Dept: URBAN - METROPOLITAN AREA CLINIC 9 | Facility: CLINIC | Age: 80
End: 2020-12-01

## 2020-12-01 ENCOUNTER — OFFICE VISIT (OUTPATIENT)
Age: 80
End: 2020-12-01

## 2020-12-08 ENCOUNTER — OFFICE VISIT (OUTPATIENT)
Dept: URBAN - METROPOLITAN AREA CLINIC 9 | Facility: CLINIC | Age: 80
End: 2020-12-08

## 2021-01-05 ENCOUNTER — OFFICE VISIT (OUTPATIENT)
Dept: URBAN - METROPOLITAN AREA CLINIC 9 | Facility: CLINIC | Age: 81
End: 2021-01-05

## 2021-01-06 ENCOUNTER — FOLLOW UP AND POST INJECTION EVALUATION (OUTPATIENT)
Dept: URBAN - METROPOLITAN AREA CLINIC 26 | Facility: CLINIC | Age: 81
End: 2021-01-06

## 2021-01-06 DIAGNOSIS — H40.041: ICD-10-CM

## 2021-01-06 DIAGNOSIS — H40.9: ICD-10-CM

## 2021-01-06 DIAGNOSIS — H35.62: ICD-10-CM

## 2021-01-06 DIAGNOSIS — H35.371: ICD-10-CM

## 2021-01-06 DIAGNOSIS — H34.8322: ICD-10-CM

## 2021-01-06 DIAGNOSIS — H43.393: ICD-10-CM

## 2021-01-06 DIAGNOSIS — H57.11: ICD-10-CM

## 2021-01-06 DIAGNOSIS — H34.8110: ICD-10-CM

## 2021-01-06 DIAGNOSIS — H47.323: ICD-10-CM

## 2021-01-06 DIAGNOSIS — H35.3132: ICD-10-CM

## 2021-01-06 PROCEDURE — 92250 FUNDUS PHOTOGRAPHY W/I&R: CPT

## 2021-01-06 PROCEDURE — 92014 COMPRE OPH EXAM EST PT 1/>: CPT

## 2021-01-06 PROCEDURE — 92134 CPTRZ OPH DX IMG PST SGM RTA: CPT

## 2021-01-06 PROCEDURE — 67028 INJECTION EYE DRUG: CPT

## 2021-01-06 ASSESSMENT — VISUAL ACUITY
OD_SC: 20/400
OS_SC: 20/25-2
OD_PH: 20/200

## 2021-01-06 ASSESSMENT — TONOMETRY
OD_IOP_MMHG: 11
OS_IOP_MMHG: 10

## 2021-02-02 ENCOUNTER — OFFICE VISIT (OUTPATIENT)
Dept: URBAN - METROPOLITAN AREA CLINIC 9 | Facility: CLINIC | Age: 81
End: 2021-02-02

## 2021-02-08 ENCOUNTER — CLINICAL PROCEDURE AND DIAGNOSTIC TESTING ONLY (OUTPATIENT)
Dept: URBAN - METROPOLITAN AREA CLINIC 26 | Facility: CLINIC | Age: 81
End: 2021-02-08

## 2021-02-08 DIAGNOSIS — H34.8110: ICD-10-CM

## 2021-02-08 DIAGNOSIS — H34.8322: ICD-10-CM

## 2021-02-08 PROCEDURE — 67028 INJECTION EYE DRUG: CPT

## 2021-02-08 PROCEDURE — 92134 CPTRZ OPH DX IMG PST SGM RTA: CPT

## 2021-02-08 ASSESSMENT — VISUAL ACUITY: OD_SC: 20/200-1

## 2021-02-08 ASSESSMENT — TONOMETRY: OD_IOP_MMHG: 12

## 2021-03-23 ENCOUNTER — CLINICAL PROCEDURE AND DIAGNOSTIC TESTING ONLY (OUTPATIENT)
Dept: URBAN - METROPOLITAN AREA CLINIC 26 | Facility: CLINIC | Age: 81
End: 2021-03-23

## 2021-03-23 DIAGNOSIS — H34.8110: ICD-10-CM

## 2021-03-23 DIAGNOSIS — H47.323: ICD-10-CM

## 2021-03-23 PROCEDURE — 92250 FUNDUS PHOTOGRAPHY W/I&R: CPT

## 2021-03-23 PROCEDURE — 67028 INJECTION EYE DRUG: CPT

## 2021-03-23 ASSESSMENT — VISUAL ACUITY: OD_SC: 20/400

## 2021-03-23 ASSESSMENT — TONOMETRY: OD_IOP_MMHG: 19

## 2021-04-21 ENCOUNTER — CLINICAL PROCEDURE AND DIAGNOSTIC TESTING ONLY (OUTPATIENT)
Dept: URBAN - METROPOLITAN AREA CLINIC 26 | Facility: CLINIC | Age: 81
End: 2021-04-21

## 2021-04-21 VITALS — WEIGHT: 245 LBS | BODY MASS INDEX: 40.82 KG/M2 | HEIGHT: 65 IN

## 2021-04-21 DIAGNOSIS — H34.8110: ICD-10-CM

## 2021-04-21 DIAGNOSIS — H34.8322: ICD-10-CM

## 2021-04-21 PROCEDURE — 92134 CPTRZ OPH DX IMG PST SGM RTA: CPT

## 2021-04-21 PROCEDURE — 67028 INJECTION EYE DRUG: CPT

## 2021-04-21 ASSESSMENT — TONOMETRY: OD_IOP_MMHG: 14

## 2021-04-21 ASSESSMENT — VISUAL ACUITY
OD_PH: 20/200
OD_SC: 20/400

## 2021-05-19 ENCOUNTER — TELEPHONE ENCOUNTER (OUTPATIENT)
Dept: URBAN - METROPOLITAN AREA CLINIC 9 | Facility: CLINIC | Age: 81
End: 2021-05-19

## 2021-05-20 ENCOUNTER — CLINICAL PROCEDURE AND DIAGNOSTIC TESTING ONLY (OUTPATIENT)
Dept: URBAN - METROPOLITAN AREA CLINIC 26 | Facility: CLINIC | Age: 81
End: 2021-05-20

## 2021-05-20 DIAGNOSIS — H47.323: ICD-10-CM

## 2021-05-20 DIAGNOSIS — H34.8110: ICD-10-CM

## 2021-05-20 PROCEDURE — 92250 FUNDUS PHOTOGRAPHY W/I&R: CPT

## 2021-05-20 PROCEDURE — 67028 INJECTION EYE DRUG: CPT

## 2021-05-20 ASSESSMENT — VISUAL ACUITY: OD_SC: 20/200-1

## 2021-05-20 ASSESSMENT — TONOMETRY: OD_IOP_MMHG: 12

## 2021-06-10 ENCOUNTER — OFFICE VISIT (OUTPATIENT)
Dept: URBAN - METROPOLITAN AREA CLINIC 9 | Facility: CLINIC | Age: 81
End: 2021-06-10

## 2021-06-21 ENCOUNTER — FOLLOW UP AND POST INJECTION EVALUATION (OUTPATIENT)
Dept: URBAN - METROPOLITAN AREA CLINIC 26 | Facility: CLINIC | Age: 81
End: 2021-06-21

## 2021-06-21 DIAGNOSIS — H43.812: ICD-10-CM

## 2021-06-21 DIAGNOSIS — H47.323: ICD-10-CM

## 2021-06-21 DIAGNOSIS — H35.3132: ICD-10-CM

## 2021-06-21 DIAGNOSIS — H35.371: ICD-10-CM

## 2021-06-21 DIAGNOSIS — H34.8110: ICD-10-CM

## 2021-06-21 DIAGNOSIS — H40.9: ICD-10-CM

## 2021-06-21 DIAGNOSIS — H35.62: ICD-10-CM

## 2021-06-21 DIAGNOSIS — H57.11: ICD-10-CM

## 2021-06-21 DIAGNOSIS — H43.392: ICD-10-CM

## 2021-06-21 DIAGNOSIS — H34.8322: ICD-10-CM

## 2021-06-21 PROCEDURE — 92134 CPTRZ OPH DX IMG PST SGM RTA: CPT

## 2021-06-21 PROCEDURE — 92014 COMPRE OPH EXAM EST PT 1/>: CPT

## 2021-06-21 PROCEDURE — 92250 FUNDUS PHOTOGRAPHY W/I&R: CPT

## 2021-06-21 PROCEDURE — 67028 INJECTION EYE DRUG: CPT

## 2021-06-21 ASSESSMENT — TONOMETRY
OS_IOP_MMHG: 13
OD_IOP_MMHG: 11

## 2021-06-21 ASSESSMENT — VISUAL ACUITY
OD_SC: 20/200
OS_SC: 20/25+1

## 2021-07-27 ENCOUNTER — FOLLOW UP AND POST INJECTION EVALUATION (OUTPATIENT)
Dept: URBAN - METROPOLITAN AREA CLINIC 26 | Facility: CLINIC | Age: 81
End: 2021-07-27

## 2021-07-27 DIAGNOSIS — H53.8: ICD-10-CM

## 2021-07-27 DIAGNOSIS — H35.3132: ICD-10-CM

## 2021-07-27 DIAGNOSIS — H34.8110: ICD-10-CM

## 2021-07-27 DIAGNOSIS — H35.62: ICD-10-CM

## 2021-07-27 DIAGNOSIS — H34.8322: ICD-10-CM

## 2021-07-27 DIAGNOSIS — H43.392: ICD-10-CM

## 2021-07-27 DIAGNOSIS — H35.371: ICD-10-CM

## 2021-07-27 DIAGNOSIS — H47.323: ICD-10-CM

## 2021-07-27 DIAGNOSIS — H57.11: ICD-10-CM

## 2021-07-27 DIAGNOSIS — H40.9: ICD-10-CM

## 2021-07-27 DIAGNOSIS — H43.812: ICD-10-CM

## 2021-07-27 PROCEDURE — 92250 FUNDUS PHOTOGRAPHY W/I&R: CPT

## 2021-07-27 PROCEDURE — 67028 INJECTION EYE DRUG: CPT

## 2021-07-27 PROCEDURE — 92012 INTRM OPH EXAM EST PATIENT: CPT

## 2021-07-27 ASSESSMENT — TONOMETRY: OD_IOP_MMHG: 10

## 2021-07-27 ASSESSMENT — VISUAL ACUITY: OD_SC: 20/400+1

## 2021-08-25 ENCOUNTER — CLINICAL PROCEDURE AND DIAGNOSTIC TESTING ONLY (OUTPATIENT)
Dept: URBAN - METROPOLITAN AREA CLINIC 26 | Facility: CLINIC | Age: 81
End: 2021-08-25

## 2021-08-25 DIAGNOSIS — H34.8322: ICD-10-CM

## 2021-08-25 DIAGNOSIS — H34.8110: ICD-10-CM

## 2021-08-25 DIAGNOSIS — H35.371: ICD-10-CM

## 2021-08-25 PROCEDURE — 67028 INJECTION EYE DRUG: CPT

## 2021-08-25 PROCEDURE — 92134 CPTRZ OPH DX IMG PST SGM RTA: CPT

## 2021-08-25 ASSESSMENT — VISUAL ACUITY: OD_SC: 20/400+1

## 2021-08-25 ASSESSMENT — TONOMETRY: OD_IOP_MMHG: 10

## 2021-09-23 ENCOUNTER — CLINICAL PROCEDURE AND DIAGNOSTIC TESTING ONLY (OUTPATIENT)
Dept: URBAN - METROPOLITAN AREA CLINIC 26 | Facility: CLINIC | Age: 81
End: 2021-09-23

## 2021-09-23 DIAGNOSIS — H35.371: ICD-10-CM

## 2021-09-23 DIAGNOSIS — H34.8110: ICD-10-CM

## 2021-09-23 DIAGNOSIS — H35.3132: ICD-10-CM

## 2021-09-23 PROCEDURE — 67028 INJECTION EYE DRUG: CPT

## 2021-09-23 PROCEDURE — 92134 CPTRZ OPH DX IMG PST SGM RTA: CPT

## 2021-09-23 ASSESSMENT — TONOMETRY: OD_IOP_MMHG: 11

## 2021-09-23 ASSESSMENT — VISUAL ACUITY: OD_SC: 20/400+2

## 2021-10-25 ENCOUNTER — CLINICAL PROCEDURE AND DIAGNOSTIC TESTING ONLY (OUTPATIENT)
Dept: URBAN - METROPOLITAN AREA CLINIC 26 | Facility: CLINIC | Age: 81
End: 2021-10-25

## 2021-10-25 DIAGNOSIS — H35.371: ICD-10-CM

## 2021-10-25 DIAGNOSIS — H34.8110: ICD-10-CM

## 2021-10-25 DIAGNOSIS — H34.8322: ICD-10-CM

## 2021-10-25 PROCEDURE — 67028 INJECTION EYE DRUG: CPT

## 2021-10-25 PROCEDURE — 92134 CPTRZ OPH DX IMG PST SGM RTA: CPT

## 2021-10-25 ASSESSMENT — VISUAL ACUITY: OD_SC: 20/400+1

## 2021-10-25 ASSESSMENT — TONOMETRY: OD_IOP_MMHG: 10

## 2021-11-29 ENCOUNTER — CLINICAL PROCEDURE AND DIAGNOSTIC TESTING ONLY (OUTPATIENT)
Dept: URBAN - METROPOLITAN AREA CLINIC 26 | Facility: CLINIC | Age: 81
End: 2021-11-29

## 2021-11-29 DIAGNOSIS — H34.8322: ICD-10-CM

## 2021-11-29 DIAGNOSIS — H35.371: ICD-10-CM

## 2021-11-29 DIAGNOSIS — H47.323: ICD-10-CM

## 2021-11-29 DIAGNOSIS — H34.8110: ICD-10-CM

## 2021-11-29 PROCEDURE — 92250 FUNDUS PHOTOGRAPHY W/I&R: CPT

## 2021-11-29 PROCEDURE — 67028 INJECTION EYE DRUG: CPT

## 2021-11-29 PROCEDURE — 92134 CPTRZ OPH DX IMG PST SGM RTA: CPT

## 2021-11-29 ASSESSMENT — TONOMETRY: OD_IOP_MMHG: 10

## 2021-11-29 ASSESSMENT — VISUAL ACUITY: OD_SC: 20/400+2

## 2021-12-28 ENCOUNTER — FOLLOW UP (OUTPATIENT)
Dept: URBAN - METROPOLITAN AREA CLINIC 26 | Facility: CLINIC | Age: 81
End: 2021-12-28

## 2021-12-28 ASSESSMENT — TONOMETRY
OS_IOP_MMHG: 15
OD_IOP_MMHG: 14

## 2021-12-28 ASSESSMENT — VISUAL ACUITY
OD_SC: 20/200
OS_SC: 20/20-2

## 2022-01-05 ENCOUNTER — FOLLOW UP (OUTPATIENT)
Dept: URBAN - METROPOLITAN AREA CLINIC 26 | Facility: CLINIC | Age: 82
End: 2022-01-05

## 2022-01-05 DIAGNOSIS — H34.8110: ICD-10-CM

## 2022-01-05 PROCEDURE — 92250 FUNDUS PHOTOGRAPHY W/I&R: CPT

## 2022-01-05 PROCEDURE — 67028 INJECTION EYE DRUG: CPT

## 2022-01-05 ASSESSMENT — VISUAL ACUITY
OD_PH: 20/200-1
OD_SC: 20/400

## 2022-01-05 ASSESSMENT — TONOMETRY: OD_IOP_MMHG: 13

## 2022-01-12 ENCOUNTER — OFFICE VISIT (OUTPATIENT)
Dept: URBAN - METROPOLITAN AREA CLINIC 9 | Facility: CLINIC | Age: 82
End: 2022-01-12

## 2022-02-07 ENCOUNTER — TELEPHONE ENCOUNTER (OUTPATIENT)
Dept: URBAN - METROPOLITAN AREA CLINIC 9 | Facility: CLINIC | Age: 82
End: 2022-02-07

## 2022-02-08 ENCOUNTER — FOLLOW UP (OUTPATIENT)
Dept: URBAN - METROPOLITAN AREA CLINIC 26 | Facility: CLINIC | Age: 82
End: 2022-02-08

## 2022-02-08 DIAGNOSIS — H57.11: ICD-10-CM

## 2022-02-08 DIAGNOSIS — H35.62: ICD-10-CM

## 2022-02-08 DIAGNOSIS — H40.9: ICD-10-CM

## 2022-02-08 DIAGNOSIS — H02.89: ICD-10-CM

## 2022-02-08 DIAGNOSIS — H04.123: ICD-10-CM

## 2022-02-08 DIAGNOSIS — H43.812: ICD-10-CM

## 2022-02-08 DIAGNOSIS — H35.3132: ICD-10-CM

## 2022-02-08 DIAGNOSIS — H35.371: ICD-10-CM

## 2022-02-08 DIAGNOSIS — H47.323: ICD-10-CM

## 2022-02-08 DIAGNOSIS — H40.041: ICD-10-CM

## 2022-02-08 DIAGNOSIS — H34.8110: ICD-10-CM

## 2022-02-08 DIAGNOSIS — H43.392: ICD-10-CM

## 2022-02-08 DIAGNOSIS — H34.8322: ICD-10-CM

## 2022-02-08 DIAGNOSIS — H53.8: ICD-10-CM

## 2022-02-08 PROCEDURE — 92134 CPTRZ OPH DX IMG PST SGM RTA: CPT

## 2022-02-08 PROCEDURE — 92250 FUNDUS PHOTOGRAPHY W/I&R: CPT

## 2022-02-08 PROCEDURE — 92014 COMPRE OPH EXAM EST PT 1/>: CPT

## 2022-02-08 ASSESSMENT — VISUAL ACUITY
OD_SC: 20/200-1
OS_SC: 20/20-2

## 2022-02-08 ASSESSMENT — TONOMETRY
OD_IOP_MMHG: 10
OS_IOP_MMHG: 14

## 2022-02-22 ENCOUNTER — FOLLOW UP (OUTPATIENT)
Dept: URBAN - METROPOLITAN AREA CLINIC 26 | Facility: CLINIC | Age: 82
End: 2022-02-22

## 2022-02-22 VITALS — HEIGHT: 65 IN | WEIGHT: 235 LBS | BODY MASS INDEX: 39.15 KG/M2

## 2022-02-22 DIAGNOSIS — H47.323: ICD-10-CM

## 2022-02-22 DIAGNOSIS — H35.371: ICD-10-CM

## 2022-02-22 DIAGNOSIS — H35.62: ICD-10-CM

## 2022-02-22 DIAGNOSIS — H40.9: ICD-10-CM

## 2022-02-22 DIAGNOSIS — H04.123: ICD-10-CM

## 2022-02-22 DIAGNOSIS — H43.392: ICD-10-CM

## 2022-02-22 DIAGNOSIS — H35.3132: ICD-10-CM

## 2022-02-22 DIAGNOSIS — H53.8: ICD-10-CM

## 2022-02-22 DIAGNOSIS — H34.8110: ICD-10-CM

## 2022-02-22 DIAGNOSIS — H43.812: ICD-10-CM

## 2022-02-22 DIAGNOSIS — H40.041: ICD-10-CM

## 2022-02-22 DIAGNOSIS — H34.8322: ICD-10-CM

## 2022-02-22 PROCEDURE — 92014 COMPRE OPH EXAM EST PT 1/>: CPT

## 2022-02-22 PROCEDURE — 92134 CPTRZ OPH DX IMG PST SGM RTA: CPT

## 2022-02-22 PROCEDURE — 67028 INJECTION EYE DRUG: CPT

## 2022-02-22 ASSESSMENT — TONOMETRY
OS_IOP_MMHG: 15
OD_IOP_MMHG: 16

## 2022-02-22 ASSESSMENT — VISUAL ACUITY
OS_SC: 20/20
OD_SC: CF 4FT

## 2022-03-09 ENCOUNTER — TELEPHONE ENCOUNTER (OUTPATIENT)
Dept: URBAN - METROPOLITAN AREA CLINIC 9 | Facility: CLINIC | Age: 82
End: 2022-03-09

## 2022-03-10 ENCOUNTER — OFFICE VISIT (OUTPATIENT)
Dept: URBAN - METROPOLITAN AREA CLINIC 9 | Facility: CLINIC | Age: 82
End: 2022-03-10

## 2022-03-25 ENCOUNTER — CLINIC PROCEDURE ONLY (OUTPATIENT)
Dept: URBAN - METROPOLITAN AREA CLINIC 26 | Facility: CLINIC | Age: 82
End: 2022-03-25

## 2022-03-25 DIAGNOSIS — H34.8322: ICD-10-CM

## 2022-03-25 DIAGNOSIS — H35.371: ICD-10-CM

## 2022-03-25 DIAGNOSIS — H34.8110: ICD-10-CM

## 2022-03-25 PROCEDURE — 67028 INJECTION EYE DRUG: CPT

## 2022-03-25 PROCEDURE — 92134 CPTRZ OPH DX IMG PST SGM RTA: CPT

## 2022-03-25 ASSESSMENT — VISUAL ACUITY: OD_SC: 20/400+1

## 2022-03-25 ASSESSMENT — TONOMETRY: OD_IOP_MMHG: 10

## 2022-04-25 ENCOUNTER — CLINIC PROCEDURE ONLY (OUTPATIENT)
Dept: URBAN - METROPOLITAN AREA CLINIC 26 | Facility: CLINIC | Age: 82
End: 2022-04-25

## 2022-04-25 DIAGNOSIS — H34.8322: ICD-10-CM

## 2022-04-25 DIAGNOSIS — H35.371: ICD-10-CM

## 2022-04-25 DIAGNOSIS — H34.8110: ICD-10-CM

## 2022-04-25 PROCEDURE — 92134 CPTRZ OPH DX IMG PST SGM RTA: CPT

## 2022-04-25 PROCEDURE — 67028 INJECTION EYE DRUG: CPT

## 2022-04-25 ASSESSMENT — TONOMETRY: OD_IOP_MMHG: 10

## 2022-05-24 ENCOUNTER — CLINIC PROCEDURE ONLY (OUTPATIENT)
Dept: URBAN - METROPOLITAN AREA CLINIC 26 | Facility: CLINIC | Age: 82
End: 2022-05-24

## 2022-05-24 DIAGNOSIS — H35.3132: ICD-10-CM

## 2022-05-24 DIAGNOSIS — H34.8110: ICD-10-CM

## 2022-05-24 DIAGNOSIS — H35.371: ICD-10-CM

## 2022-05-24 PROCEDURE — 92250 FUNDUS PHOTOGRAPHY W/I&R: CPT

## 2022-05-24 PROCEDURE — 67028 INJECTION EYE DRUG: CPT

## 2022-05-24 PROCEDURE — 92134 CPTRZ OPH DX IMG PST SGM RTA: CPT

## 2022-05-24 ASSESSMENT — TONOMETRY: OD_IOP_MMHG: 11

## 2022-06-23 ENCOUNTER — CLINIC PROCEDURE ONLY (OUTPATIENT)
Dept: URBAN - METROPOLITAN AREA CLINIC 26 | Facility: CLINIC | Age: 82
End: 2022-06-23

## 2022-06-23 DIAGNOSIS — H34.8110: ICD-10-CM

## 2022-06-23 DIAGNOSIS — H35.371: ICD-10-CM

## 2022-06-23 DIAGNOSIS — H34.8322: ICD-10-CM

## 2022-06-23 PROCEDURE — 92250 FUNDUS PHOTOGRAPHY W/I&R: CPT

## 2022-06-23 PROCEDURE — 92134 CPTRZ OPH DX IMG PST SGM RTA: CPT

## 2022-06-23 PROCEDURE — 67028 INJECTION EYE DRUG: CPT

## 2022-07-09 ENCOUNTER — TELEPHONE ENCOUNTER (OUTPATIENT)
Dept: URBAN - METROPOLITAN AREA CLINIC 121 | Facility: CLINIC | Age: 82
End: 2022-07-09

## 2022-07-10 ENCOUNTER — TELEPHONE ENCOUNTER (OUTPATIENT)
Dept: URBAN - METROPOLITAN AREA CLINIC 121 | Facility: CLINIC | Age: 82
End: 2022-07-10

## 2022-07-18 ENCOUNTER — TELEPHONE ENCOUNTER (OUTPATIENT)
Dept: URBAN - METROPOLITAN AREA CLINIC 9 | Facility: CLINIC | Age: 82
End: 2022-07-18

## 2022-07-25 ENCOUNTER — CLINIC PROCEDURE ONLY (OUTPATIENT)
Dept: URBAN - METROPOLITAN AREA CLINIC 26 | Facility: CLINIC | Age: 82
End: 2022-07-25

## 2022-07-25 DIAGNOSIS — H34.8110: ICD-10-CM

## 2022-07-25 DIAGNOSIS — H34.8322: ICD-10-CM

## 2022-07-25 DIAGNOSIS — H35.371: ICD-10-CM

## 2022-07-25 PROCEDURE — 92134 CPTRZ OPH DX IMG PST SGM RTA: CPT

## 2022-07-25 PROCEDURE — 67028 INJECTION EYE DRUG: CPT

## 2022-07-25 PROCEDURE — 92250 FUNDUS PHOTOGRAPHY W/I&R: CPT

## 2022-07-25 ASSESSMENT — TONOMETRY: OD_IOP_MMHG: 14

## 2022-07-30 ENCOUNTER — TELEPHONE ENCOUNTER (OUTPATIENT)
Age: 82
End: 2022-07-30

## 2022-07-30 RX ORDER — PANTOPRAZOLE SODIUM 40 MG/1
1 (ONE) TABLET, DELAYED RELEASE ORAL DAILY
Qty: 0 | Refills: 3 | OUTPATIENT
Start: 2021-06-10 | End: 2022-01-12

## 2022-07-30 RX ORDER — PANTOPRAZOLE SODIUM 40 MG/1
1 (ONE) TABLET, DELAYED RELEASE ORAL
Qty: 0 | Refills: 3 | OUTPATIENT
Start: 2020-12-08 | End: 2021-06-10

## 2022-07-31 ENCOUNTER — TELEPHONE ENCOUNTER (OUTPATIENT)
Age: 82
End: 2022-07-31

## 2022-07-31 RX ORDER — PANTOPRAZOLE SODIUM 40 MG/1
1 (ONE) TABLET, DELAYED RELEASE ORAL
Qty: 0 | Refills: 3 | Status: ACTIVE | COMMUNITY
Start: 2020-12-08

## 2022-07-31 RX ORDER — PANTOPRAZOLE SODIUM 40 MG/1
1 (ONE) TABLET, DELAYED RELEASE ORAL DAILY
Qty: 0 | Refills: 3 | Status: ACTIVE | COMMUNITY
Start: 2021-06-10

## 2022-08-26 ENCOUNTER — FOLLOW UP (OUTPATIENT)
Dept: URBAN - METROPOLITAN AREA CLINIC 26 | Facility: CLINIC | Age: 82
End: 2022-08-26

## 2022-08-26 VITALS — BODY MASS INDEX: 38.89 KG/M2 | WEIGHT: 242 LBS | HEIGHT: 66 IN

## 2022-08-26 DIAGNOSIS — H40.9: ICD-10-CM

## 2022-08-26 DIAGNOSIS — H35.3132: ICD-10-CM

## 2022-08-26 DIAGNOSIS — H34.8110: ICD-10-CM

## 2022-08-26 DIAGNOSIS — H35.62: ICD-10-CM

## 2022-08-26 DIAGNOSIS — H04.123: ICD-10-CM

## 2022-08-26 DIAGNOSIS — H40.041: ICD-10-CM

## 2022-08-26 DIAGNOSIS — H34.8322: ICD-10-CM

## 2022-08-26 DIAGNOSIS — H35.371: ICD-10-CM

## 2022-08-26 DIAGNOSIS — H57.11: ICD-10-CM

## 2022-08-26 DIAGNOSIS — H47.323: ICD-10-CM

## 2022-08-26 DIAGNOSIS — H43.392: ICD-10-CM

## 2022-08-26 DIAGNOSIS — H43.812: ICD-10-CM

## 2022-08-26 PROCEDURE — 67028 INJECTION EYE DRUG: CPT

## 2022-08-26 PROCEDURE — 92134 CPTRZ OPH DX IMG PST SGM RTA: CPT

## 2022-08-26 PROCEDURE — 92014 COMPRE OPH EXAM EST PT 1/>: CPT

## 2022-08-26 PROCEDURE — 92250 FUNDUS PHOTOGRAPHY W/I&R: CPT

## 2022-08-26 ASSESSMENT — VISUAL ACUITY
OS_SC: 20/30-1
OD_SC: CF 4FT

## 2022-08-26 ASSESSMENT — TONOMETRY
OD_IOP_MMHG: 14
OS_IOP_MMHG: 18

## 2022-09-26 ENCOUNTER — CLINIC PROCEDURE ONLY (OUTPATIENT)
Dept: URBAN - METROPOLITAN AREA CLINIC 26 | Facility: CLINIC | Age: 82
End: 2022-09-26

## 2022-09-26 DIAGNOSIS — H34.8110: ICD-10-CM

## 2022-09-26 DIAGNOSIS — H34.8322: ICD-10-CM

## 2022-09-26 PROCEDURE — 92134 CPTRZ OPH DX IMG PST SGM RTA: CPT

## 2022-09-26 PROCEDURE — 92250 FUNDUS PHOTOGRAPHY W/I&R: CPT

## 2022-09-26 PROCEDURE — 67028 INJECTION EYE DRUG: CPT

## 2022-09-26 ASSESSMENT — TONOMETRY: OD_IOP_MMHG: 11

## 2022-10-25 ENCOUNTER — CLINIC PROCEDURE ONLY (OUTPATIENT)
Dept: URBAN - METROPOLITAN AREA CLINIC 26 | Facility: CLINIC | Age: 82
End: 2022-10-25

## 2022-10-25 DIAGNOSIS — H34.8110: ICD-10-CM

## 2022-10-25 DIAGNOSIS — H34.8322: ICD-10-CM

## 2022-10-25 DIAGNOSIS — H35.371: ICD-10-CM

## 2022-10-25 PROCEDURE — 92134 CPTRZ OPH DX IMG PST SGM RTA: CPT

## 2022-10-25 PROCEDURE — 92250 FUNDUS PHOTOGRAPHY W/I&R: CPT

## 2022-10-25 PROCEDURE — 67028 INJECTION EYE DRUG: CPT

## 2022-10-25 ASSESSMENT — TONOMETRY: OD_IOP_MMHG: 10

## 2022-11-28 ENCOUNTER — CLINIC PROCEDURE ONLY (OUTPATIENT)
Dept: URBAN - METROPOLITAN AREA CLINIC 26 | Facility: CLINIC | Age: 82
End: 2022-11-28

## 2022-11-28 DIAGNOSIS — H35.371: ICD-10-CM

## 2022-11-28 DIAGNOSIS — H34.8110: ICD-10-CM

## 2022-11-28 DIAGNOSIS — H47.323: ICD-10-CM

## 2022-11-28 PROCEDURE — 92134 CPTRZ OPH DX IMG PST SGM RTA: CPT

## 2022-11-28 PROCEDURE — 67028 INJECTION EYE DRUG: CPT

## 2022-11-28 PROCEDURE — 92250 FUNDUS PHOTOGRAPHY W/I&R: CPT

## 2022-11-28 ASSESSMENT — TONOMETRY: OD_IOP_MMHG: 10

## 2022-12-28 ENCOUNTER — CLINIC PROCEDURE ONLY (OUTPATIENT)
Dept: URBAN - METROPOLITAN AREA CLINIC 26 | Facility: CLINIC | Age: 82
End: 2022-12-28

## 2022-12-28 PROCEDURE — 67028 INJECTION EYE DRUG: CPT

## 2022-12-28 PROCEDURE — 92250 FUNDUS PHOTOGRAPHY W/I&R: CPT

## 2022-12-28 PROCEDURE — 92134 CPTRZ OPH DX IMG PST SGM RTA: CPT

## 2022-12-28 ASSESSMENT — TONOMETRY: OD_IOP_MMHG: 10

## 2023-01-27 ENCOUNTER — CLINIC PROCEDURE ONLY (OUTPATIENT)
Dept: URBAN - METROPOLITAN AREA CLINIC 26 | Facility: CLINIC | Age: 83
End: 2023-01-27

## 2023-01-27 DIAGNOSIS — H34.8110: ICD-10-CM

## 2023-01-27 DIAGNOSIS — H35.371: ICD-10-CM

## 2023-01-27 DIAGNOSIS — H34.8322: ICD-10-CM

## 2023-01-27 PROCEDURE — 92250 FUNDUS PHOTOGRAPHY W/I&R: CPT

## 2023-01-27 PROCEDURE — 67028 INJECTION EYE DRUG: CPT

## 2023-01-27 PROCEDURE — 92134 CPTRZ OPH DX IMG PST SGM RTA: CPT

## 2023-01-27 ASSESSMENT — TONOMETRY: OD_IOP_MMHG: 11

## 2023-04-28 ENCOUNTER — CLINIC PROCEDURE ONLY (OUTPATIENT)
Dept: URBAN - METROPOLITAN AREA CLINIC 26 | Facility: CLINIC | Age: 83
End: 2023-04-28

## 2023-04-28 DIAGNOSIS — H34.8110: ICD-10-CM

## 2023-04-28 DIAGNOSIS — H34.8322: ICD-10-CM

## 2023-04-28 PROCEDURE — 67028 INJECTION EYE DRUG: CPT

## 2023-04-28 PROCEDURE — 92250 FUNDUS PHOTOGRAPHY W/I&R: CPT

## 2023-04-28 PROCEDURE — 92134 CPTRZ OPH DX IMG PST SGM RTA: CPT

## 2023-04-28 ASSESSMENT — TONOMETRY: OD_IOP_MMHG: 17

## 2023-05-26 ENCOUNTER — CLINIC PROCEDURE ONLY (OUTPATIENT)
Dept: URBAN - METROPOLITAN AREA CLINIC 26 | Facility: CLINIC | Age: 83
End: 2023-05-26

## 2023-05-26 DIAGNOSIS — G45.3: ICD-10-CM

## 2023-05-26 DIAGNOSIS — H34.8322: ICD-10-CM

## 2023-05-26 DIAGNOSIS — H34.8110: ICD-10-CM

## 2023-05-26 PROCEDURE — 92134 CPTRZ OPH DX IMG PST SGM RTA: CPT

## 2023-05-26 PROCEDURE — 92250 FUNDUS PHOTOGRAPHY W/I&R: CPT

## 2023-05-26 PROCEDURE — 67028 INJECTION EYE DRUG: CPT

## 2023-05-26 ASSESSMENT — TONOMETRY: OD_IOP_MMHG: 14

## 2023-06-23 ENCOUNTER — CLINIC PROCEDURE ONLY (OUTPATIENT)
Dept: URBAN - METROPOLITAN AREA CLINIC 26 | Facility: CLINIC | Age: 83
End: 2023-06-23

## 2023-06-23 DIAGNOSIS — H35.3114: ICD-10-CM

## 2023-06-23 DIAGNOSIS — H34.8110: ICD-10-CM

## 2023-06-23 DIAGNOSIS — G45.3: ICD-10-CM

## 2023-06-23 DIAGNOSIS — H34.8322: ICD-10-CM

## 2023-06-23 PROCEDURE — 92134 CPTRZ OPH DX IMG PST SGM RTA: CPT

## 2023-06-23 PROCEDURE — 92250 FUNDUS PHOTOGRAPHY W/I&R: CPT

## 2023-06-23 PROCEDURE — 67028 INJECTION EYE DRUG: CPT

## 2023-06-23 ASSESSMENT — TONOMETRY: OD_IOP_MMHG: 12

## 2023-07-28 ENCOUNTER — CLINIC PROCEDURE ONLY (OUTPATIENT)
Dept: URBAN - METROPOLITAN AREA CLINIC 26 | Facility: CLINIC | Age: 83
End: 2023-07-28

## 2023-07-28 DIAGNOSIS — G45.3: ICD-10-CM

## 2023-07-28 DIAGNOSIS — H34.8322: ICD-10-CM

## 2023-07-28 DIAGNOSIS — H35.3114: ICD-10-CM

## 2023-07-28 DIAGNOSIS — H34.8110: ICD-10-CM

## 2023-07-28 PROCEDURE — 92134 CPTRZ OPH DX IMG PST SGM RTA: CPT

## 2023-07-28 PROCEDURE — 92250 FUNDUS PHOTOGRAPHY W/I&R: CPT

## 2023-07-28 PROCEDURE — 67028 INJECTION EYE DRUG: CPT

## 2023-07-28 ASSESSMENT — TONOMETRY: OD_IOP_MMHG: 14

## 2023-08-25 ENCOUNTER — CLINIC PROCEDURE ONLY (OUTPATIENT)
Dept: URBAN - METROPOLITAN AREA CLINIC 26 | Facility: CLINIC | Age: 83
End: 2023-08-25

## 2023-08-25 DIAGNOSIS — G45.3: ICD-10-CM

## 2023-08-25 DIAGNOSIS — H34.8322: ICD-10-CM

## 2023-08-25 DIAGNOSIS — H35.3114: ICD-10-CM

## 2023-08-25 DIAGNOSIS — H34.8110: ICD-10-CM

## 2023-08-25 PROCEDURE — 92134 CPTRZ OPH DX IMG PST SGM RTA: CPT

## 2023-08-25 PROCEDURE — 67028 INJECTION EYE DRUG: CPT

## 2023-08-25 PROCEDURE — 92250 FUNDUS PHOTOGRAPHY W/I&R: CPT

## 2023-08-25 ASSESSMENT — TONOMETRY: OD_IOP_MMHG: 9

## 2023-10-02 ENCOUNTER — CLINIC PROCEDURE ONLY (OUTPATIENT)
Dept: URBAN - METROPOLITAN AREA CLINIC 26 | Facility: CLINIC | Age: 83
End: 2023-10-02

## 2023-10-02 DIAGNOSIS — H34.8322: ICD-10-CM

## 2023-10-02 DIAGNOSIS — H35.3114: ICD-10-CM

## 2023-10-02 DIAGNOSIS — H34.8110: ICD-10-CM

## 2023-10-02 DIAGNOSIS — H47.323: ICD-10-CM

## 2023-10-02 DIAGNOSIS — G45.3: ICD-10-CM

## 2023-10-02 PROCEDURE — 67028 INJECTION EYE DRUG: CPT

## 2023-10-02 PROCEDURE — 92134 CPTRZ OPH DX IMG PST SGM RTA: CPT

## 2023-10-02 PROCEDURE — 92250 FUNDUS PHOTOGRAPHY W/I&R: CPT | Mod: 59

## 2023-10-02 ASSESSMENT — TONOMETRY: OD_IOP_MMHG: 12

## 2023-11-08 ENCOUNTER — COMPREHENSIVE EXAM (OUTPATIENT)
Dept: URBAN - METROPOLITAN AREA CLINIC 26 | Facility: CLINIC | Age: 83
End: 2023-11-08

## 2023-11-08 DIAGNOSIS — H35.371: ICD-10-CM

## 2023-11-08 DIAGNOSIS — H40.9: ICD-10-CM

## 2023-11-08 DIAGNOSIS — H35.3132: ICD-10-CM

## 2023-11-08 DIAGNOSIS — H34.8110: ICD-10-CM

## 2023-11-08 DIAGNOSIS — H43.392: ICD-10-CM

## 2023-11-08 DIAGNOSIS — H34.8322: ICD-10-CM

## 2023-11-08 DIAGNOSIS — H35.3114: ICD-10-CM

## 2023-11-08 DIAGNOSIS — H43.812: ICD-10-CM

## 2023-11-08 DIAGNOSIS — H04.123: ICD-10-CM

## 2023-11-08 DIAGNOSIS — H47.323: ICD-10-CM

## 2023-11-08 DIAGNOSIS — H35.62: ICD-10-CM

## 2023-11-08 DIAGNOSIS — H40.041: ICD-10-CM

## 2023-11-08 PROCEDURE — 92014 COMPRE OPH EXAM EST PT 1/>: CPT

## 2023-11-08 PROCEDURE — 92134 CPTRZ OPH DX IMG PST SGM RTA: CPT

## 2023-11-08 PROCEDURE — 67028 INJECTION EYE DRUG: CPT

## 2023-11-08 PROCEDURE — 92250 FUNDUS PHOTOGRAPHY W/I&R: CPT

## 2023-11-08 ASSESSMENT — TONOMETRY
OD_IOP_MMHG: 11
OS_IOP_MMHG: 10

## 2023-11-08 ASSESSMENT — VISUAL ACUITY
OD_SC: 20/400
OS_SC: 20/25-2

## 2023-12-11 ENCOUNTER — COMPREHENSIVE EXAM (OUTPATIENT)
Dept: URBAN - METROPOLITAN AREA CLINIC 26 | Facility: CLINIC | Age: 83
End: 2023-12-11

## 2023-12-11 DIAGNOSIS — G45.3: ICD-10-CM

## 2023-12-11 DIAGNOSIS — H34.8110: ICD-10-CM

## 2023-12-11 DIAGNOSIS — H34.8322: ICD-10-CM

## 2023-12-11 PROCEDURE — 67028 INJECTION EYE DRUG: CPT

## 2023-12-11 PROCEDURE — 92134 CPTRZ OPH DX IMG PST SGM RTA: CPT

## 2023-12-11 PROCEDURE — 92250 FUNDUS PHOTOGRAPHY W/I&R: CPT

## 2023-12-11 ASSESSMENT — VISUAL ACUITY
OD_SC: CF 2FT
OS_SC: 20/30-2

## 2023-12-11 ASSESSMENT — TONOMETRY
OD_IOP_MMHG: 9
OS_IOP_MMHG: 10

## 2024-01-30 ENCOUNTER — CLINIC PROCEDURE ONLY (OUTPATIENT)
Dept: URBAN - METROPOLITAN AREA CLINIC 26 | Facility: CLINIC | Age: 84
End: 2024-01-30

## 2024-01-30 DIAGNOSIS — H34.8110: ICD-10-CM

## 2024-01-30 DIAGNOSIS — G45.3: ICD-10-CM

## 2024-01-30 DIAGNOSIS — H34.8322: ICD-10-CM

## 2024-01-30 PROCEDURE — 92134 CPTRZ OPH DX IMG PST SGM RTA: CPT

## 2024-01-30 PROCEDURE — 92250 FUNDUS PHOTOGRAPHY W/I&R: CPT

## 2024-01-30 PROCEDURE — 67028 INJECTION EYE DRUG: CPT

## 2024-01-30 ASSESSMENT — TONOMETRY: OD_IOP_MMHG: 10

## 2024-03-05 ENCOUNTER — CLINIC PROCEDURE ONLY (OUTPATIENT)
Dept: URBAN - METROPOLITAN AREA CLINIC 26 | Facility: CLINIC | Age: 84
End: 2024-03-05

## 2024-03-05 DIAGNOSIS — G45.3: ICD-10-CM

## 2024-03-05 DIAGNOSIS — H34.8110: ICD-10-CM

## 2024-03-05 DIAGNOSIS — H34.8322: ICD-10-CM

## 2024-03-05 PROCEDURE — 92134 CPTRZ OPH DX IMG PST SGM RTA: CPT

## 2024-03-05 PROCEDURE — 67028 INJECTION EYE DRUG: CPT

## 2024-03-05 PROCEDURE — 92250 FUNDUS PHOTOGRAPHY W/I&R: CPT

## 2024-03-05 ASSESSMENT — TONOMETRY: OD_IOP_MMHG: 10

## 2024-04-02 ENCOUNTER — CLINIC PROCEDURE ONLY (OUTPATIENT)
Dept: URBAN - METROPOLITAN AREA CLINIC 26 | Facility: CLINIC | Age: 84
End: 2024-04-02

## 2024-04-02 DIAGNOSIS — H34.8322: ICD-10-CM

## 2024-04-02 DIAGNOSIS — H34.8110: ICD-10-CM

## 2024-04-02 DIAGNOSIS — G45.3: ICD-10-CM

## 2024-04-02 PROCEDURE — 92250 FUNDUS PHOTOGRAPHY W/I&R: CPT | Mod: 59

## 2024-04-02 PROCEDURE — 67028 INJECTION EYE DRUG: CPT

## 2024-04-02 PROCEDURE — 92134 CPTRZ OPH DX IMG PST SGM RTA: CPT

## 2024-04-02 ASSESSMENT — TONOMETRY: OD_IOP_MMHG: 11

## 2024-04-30 ENCOUNTER — CLINIC PROCEDURE ONLY (OUTPATIENT)
Dept: URBAN - METROPOLITAN AREA CLINIC 26 | Facility: CLINIC | Age: 84
End: 2024-04-30

## 2024-04-30 DIAGNOSIS — G45.3: ICD-10-CM

## 2024-04-30 DIAGNOSIS — H34.8322: ICD-10-CM

## 2024-04-30 DIAGNOSIS — H34.8110: ICD-10-CM

## 2024-04-30 PROCEDURE — 92134 CPTRZ OPH DX IMG PST SGM RTA: CPT

## 2024-04-30 PROCEDURE — 67028 INJECTION EYE DRUG: CPT

## 2024-04-30 PROCEDURE — 92250 FUNDUS PHOTOGRAPHY W/I&R: CPT | Mod: 59

## 2024-04-30 ASSESSMENT — TONOMETRY: OD_IOP_MMHG: 14

## 2024-06-04 ENCOUNTER — CLINIC PROCEDURE ONLY (OUTPATIENT)
Dept: URBAN - METROPOLITAN AREA CLINIC 26 | Facility: CLINIC | Age: 84
End: 2024-06-04

## 2024-06-04 DIAGNOSIS — H34.8322: ICD-10-CM

## 2024-06-04 DIAGNOSIS — H34.8110: ICD-10-CM

## 2024-06-04 PROCEDURE — 67028 INJECTION EYE DRUG: CPT

## 2024-06-04 PROCEDURE — 92134 CPTRZ OPH DX IMG PST SGM RTA: CPT

## 2024-06-04 ASSESSMENT — TONOMETRY: OD_IOP_MMHG: 13

## 2024-07-16 ENCOUNTER — CLINIC PROCEDURE ONLY (OUTPATIENT)
Dept: URBAN - METROPOLITAN AREA CLINIC 26 | Facility: CLINIC | Age: 84
End: 2024-07-16

## 2024-07-16 DIAGNOSIS — H34.8110: ICD-10-CM

## 2024-07-16 DIAGNOSIS — H34.8322: ICD-10-CM

## 2024-07-16 DIAGNOSIS — G45.3: ICD-10-CM

## 2024-07-16 PROCEDURE — 92250 FUNDUS PHOTOGRAPHY W/I&R: CPT | Mod: 59

## 2024-07-16 PROCEDURE — 67028 INJECTION EYE DRUG: CPT

## 2024-07-16 PROCEDURE — 92134 CPTRZ OPH DX IMG PST SGM RTA: CPT

## 2024-07-16 ASSESSMENT — TONOMETRY: OD_IOP_MMHG: 13

## 2024-08-20 ENCOUNTER — CLINIC PROCEDURE ONLY (OUTPATIENT)
Dept: URBAN - METROPOLITAN AREA CLINIC 26 | Facility: CLINIC | Age: 84
End: 2024-08-20

## 2024-08-20 DIAGNOSIS — G45.3: ICD-10-CM

## 2024-08-20 DIAGNOSIS — H34.8322: ICD-10-CM

## 2024-08-20 DIAGNOSIS — H34.8110: ICD-10-CM

## 2024-08-20 PROCEDURE — 67028 INJECTION EYE DRUG: CPT

## 2024-08-20 PROCEDURE — 92134 CPTRZ OPH DX IMG PST SGM RTA: CPT

## 2024-08-20 PROCEDURE — 92250 FUNDUS PHOTOGRAPHY W/I&R: CPT | Mod: 59

## 2024-08-20 ASSESSMENT — TONOMETRY: OD_IOP_MMHG: 6

## 2024-09-24 ENCOUNTER — CLINIC PROCEDURE ONLY (OUTPATIENT)
Dept: URBAN - METROPOLITAN AREA CLINIC 26 | Facility: CLINIC | Age: 84
End: 2024-09-24

## 2024-09-24 DIAGNOSIS — G45.3: ICD-10-CM

## 2024-09-24 DIAGNOSIS — H34.8110: ICD-10-CM

## 2024-09-24 DIAGNOSIS — H34.8322: ICD-10-CM

## 2024-09-24 PROCEDURE — 92250 FUNDUS PHOTOGRAPHY W/I&R: CPT | Mod: 59

## 2024-09-24 PROCEDURE — 92134 CPTRZ OPH DX IMG PST SGM RTA: CPT

## 2024-09-24 PROCEDURE — 67028 INJECTION EYE DRUG: CPT

## 2024-10-30 ENCOUNTER — COMPREHENSIVE EXAM (OUTPATIENT)
Dept: URBAN - METROPOLITAN AREA CLINIC 26 | Facility: CLINIC | Age: 84
End: 2024-10-30

## 2024-10-30 VITALS — WEIGHT: 234 LBS | HEIGHT: 65 IN | BODY MASS INDEX: 38.99 KG/M2

## 2024-10-30 DIAGNOSIS — Z98.890: ICD-10-CM

## 2024-10-30 DIAGNOSIS — H40.041: ICD-10-CM

## 2024-10-30 DIAGNOSIS — H34.8110: ICD-10-CM

## 2024-10-30 DIAGNOSIS — G45.3: ICD-10-CM

## 2024-10-30 DIAGNOSIS — H57.11: ICD-10-CM

## 2024-10-30 DIAGNOSIS — H40.9: ICD-10-CM

## 2024-10-30 DIAGNOSIS — H35.3114: ICD-10-CM

## 2024-10-30 DIAGNOSIS — H04.123: ICD-10-CM

## 2024-10-30 DIAGNOSIS — H35.62: ICD-10-CM

## 2024-10-30 DIAGNOSIS — H02.833: ICD-10-CM

## 2024-10-30 DIAGNOSIS — H43.812: ICD-10-CM

## 2024-10-30 DIAGNOSIS — H53.8: ICD-10-CM

## 2024-10-30 DIAGNOSIS — H02.401: ICD-10-CM

## 2024-10-30 DIAGNOSIS — H35.371: ICD-10-CM

## 2024-10-30 DIAGNOSIS — H34.8322: ICD-10-CM

## 2024-10-30 DIAGNOSIS — H47.323: ICD-10-CM

## 2024-10-30 DIAGNOSIS — H35.3132: ICD-10-CM

## 2024-10-30 DIAGNOSIS — H02.836: ICD-10-CM

## 2024-10-30 DIAGNOSIS — Z96.1: ICD-10-CM

## 2024-10-30 DIAGNOSIS — H02.89: ICD-10-CM

## 2024-10-30 DIAGNOSIS — H43.392: ICD-10-CM

## 2024-10-30 PROCEDURE — 92134 CPTRZ OPH DX IMG PST SGM RTA: CPT

## 2024-10-30 PROCEDURE — 67028 INJECTION EYE DRUG: CPT

## 2024-10-30 PROCEDURE — 92250 FUNDUS PHOTOGRAPHY W/I&R: CPT | Mod: 59

## 2024-10-30 PROCEDURE — 92014 COMPRE OPH EXAM EST PT 1/>: CPT

## 2024-10-30 PROCEDURE — J2777PFS VABYSMO PFS: Mod: JZ,RT

## 2024-12-05 ENCOUNTER — P2P PATIENT RECORD (OUTPATIENT)
Age: 84
End: 2024-12-05

## 2024-12-10 ENCOUNTER — CLINIC PROCEDURE ONLY (OUTPATIENT)
Age: 84
End: 2024-12-10

## 2024-12-10 DIAGNOSIS — H34.8322: ICD-10-CM

## 2024-12-10 DIAGNOSIS — G45.3: ICD-10-CM

## 2024-12-10 DIAGNOSIS — H34.8110: ICD-10-CM

## 2024-12-10 PROCEDURE — 92134 CPTRZ OPH DX IMG PST SGM RTA: CPT

## 2024-12-10 PROCEDURE — 67028 INJECTION EYE DRUG: CPT

## 2024-12-10 PROCEDURE — J2777PFS VABYSMO PFS: Mod: JZ

## 2024-12-10 PROCEDURE — 92250 FUNDUS PHOTOGRAPHY W/I&R: CPT | Mod: 59

## 2025-01-14 ENCOUNTER — CLINIC PROCEDURE ONLY (OUTPATIENT)
Age: 85
End: 2025-01-14

## 2025-01-14 DIAGNOSIS — H34.8110: ICD-10-CM

## 2025-01-14 DIAGNOSIS — G45.3: ICD-10-CM

## 2025-01-14 DIAGNOSIS — H34.8322: ICD-10-CM

## 2025-01-14 PROCEDURE — 92134 CPTRZ OPH DX IMG PST SGM RTA: CPT

## 2025-01-14 PROCEDURE — 92250 FUNDUS PHOTOGRAPHY W/I&R: CPT | Mod: 59

## 2025-01-14 PROCEDURE — J2777PFS VABYSMO PFS: Mod: JZ

## 2025-01-14 PROCEDURE — 67028 INJECTION EYE DRUG: CPT

## 2025-02-25 ENCOUNTER — CLINIC PROCEDURE ONLY (OUTPATIENT)
Age: 85
End: 2025-02-25

## 2025-02-25 DIAGNOSIS — H34.8322: ICD-10-CM

## 2025-02-25 DIAGNOSIS — H34.8110: ICD-10-CM

## 2025-02-25 DIAGNOSIS — G45.3: ICD-10-CM

## 2025-02-25 PROCEDURE — J2777PFS VABYSMO PFS: Mod: JZ

## 2025-02-25 PROCEDURE — 92134 CPTRZ OPH DX IMG PST SGM RTA: CPT

## 2025-02-25 PROCEDURE — 92250 FUNDUS PHOTOGRAPHY W/I&R: CPT | Mod: 59

## 2025-02-25 PROCEDURE — 67028 INJECTION EYE DRUG: CPT

## 2025-03-05 ENCOUNTER — DASHBOARD ENCOUNTERS (OUTPATIENT)
Age: 85
End: 2025-03-05

## 2025-03-05 ENCOUNTER — OFFICE VISIT (OUTPATIENT)
Dept: URBAN - METROPOLITAN AREA CLINIC 9 | Facility: CLINIC | Age: 85
End: 2025-03-05
Payer: MEDICARE

## 2025-03-05 VITALS
WEIGHT: 224 LBS | DIASTOLIC BLOOD PRESSURE: 72 MMHG | SYSTOLIC BLOOD PRESSURE: 136 MMHG | BODY MASS INDEX: 36 KG/M2 | HEIGHT: 66 IN

## 2025-03-05 DIAGNOSIS — K22.70 BARRETT'S ESOPHAGUS WITHOUT DYSPLASIA: ICD-10-CM

## 2025-03-05 DIAGNOSIS — K83.9 BILE DUCT ABNORMALITY: ICD-10-CM

## 2025-03-05 DIAGNOSIS — K58.9 IBS (IRRITABLE BOWEL SYNDROME)-C: ICD-10-CM

## 2025-03-05 PROCEDURE — 99214 OFFICE O/P EST MOD 30 MIN: CPT | Performed by: INTERNAL MEDICINE

## 2025-03-05 RX ORDER — PANTOPRAZOLE SODIUM 40 MG/1
TABLET, DELAYED RELEASE ORAL
OUTPATIENT

## 2025-03-05 RX ORDER — ENALAPRIL MALEATE 20 MG/1
1 TABLET TABLET ORAL ONCE A DAY
Qty: 30 | Status: ACTIVE | COMMUNITY
Start: 2025-03-05

## 2025-03-05 RX ORDER — POLYETHYLENE GLYCOL 3350 17 G/DOSE
1 SCOOP MIXED WITH 8 OUNCES OF FLUID POWDER (GRAM) ORAL ONCE A DAY
Qty: 30 | Refills: 1 | OUTPATIENT
Start: 2025-03-05 | End: 2025-05-04

## 2025-03-05 RX ORDER — PANTOPRAZOLE SODIUM 40 MG/1
1 (ONE) TABLET, DELAYED RELEASE ORAL
Qty: 0 | Refills: 3 | Status: ON HOLD | COMMUNITY
Start: 2020-12-08

## 2025-03-05 RX ORDER — CELECOXIB 200 MG/1
TAKE 1 CAPSULE DAILY AS    NEEDED FOR PAIN CAPSULE ORAL
Qty: 90 EACH | Refills: 0 | Status: ACTIVE | COMMUNITY

## 2025-03-05 RX ORDER — PANTOPRAZOLE SODIUM 40 MG/1
TABLET, DELAYED RELEASE ORAL
Qty: 90 TABLET | Status: ACTIVE | COMMUNITY

## 2025-03-05 RX ORDER — DORZOLAMIDE HYDROCHLORIDE AND TIMOLOL MALEATE PRESERVATIVE FREE 20; 5 MG/ML; MG/ML
INSTILL 1 DROP INTO THE    RIGHT EYE TWICE DAILY SOLUTION/ DROPS OPHTHALMIC
Qty: 1 EACH | Refills: 0 | Status: ACTIVE | COMMUNITY

## 2025-03-05 NOTE — HPI-TODAY'S VISIT:
Pt here today for evaluation of constipation, bloating and abnormal imaging with biliary dilation and esopahgeal stricture . 2022 EGD [address] Thayer with [facility], no HP, no stricture . Pt is on pantoprazole daily . Pt is having some issues with dysphagia mainly to liquids 1/2025 Ba swallow with distal stricture 12/2024 CT a/p with biliary dilation but pt is post ccx 1/2025 CBC, BMP, lfts reviewed. LFTs are negative . Pt was started on miralax for constipation and that has helped her abd bloating but not 100%. She has biliary dilation but normal lfts but her ba swallow suggested a distal stricture in the setting of frederick'. As such need EGD for dilation, f[facility]er[facility]'s eval, r/o cancer and will plan EUS also for biliary dilation which is likely post ccx. .

## 2025-03-11 ENCOUNTER — CLAIMS CREATED FROM THE CLAIM WINDOW (OUTPATIENT)
Dept: URBAN - METROPOLITAN AREA SURGERY CENTER 9 | Facility: SURGERY CENTER | Age: 85
End: 2025-03-11
Payer: MEDICARE

## 2025-03-11 ENCOUNTER — CLAIMS CREATED FROM THE CLAIM WINDOW (OUTPATIENT)
Dept: URBAN - METROPOLITAN AREA CLINIC 4 | Facility: CLINIC | Age: 85
End: 2025-03-11
Payer: MEDICARE

## 2025-03-11 DIAGNOSIS — T47.8X5A ADVERSE EFFECT OF OTHER AGENTS PRIMARILY AFFECTING GASTROINTESTINAL SYSTEM, INITIAL ENCOUNTER: ICD-10-CM

## 2025-03-11 DIAGNOSIS — K22.89 OTHER SPECIFIED DISEASE OF ESOPHAGUS: ICD-10-CM

## 2025-03-11 DIAGNOSIS — K29.70 GASTRITIS WITHOUT BLEEDING, UNSPECIFIED CHRONICITY, UNSPECIFIED GASTRITIS TYPE: ICD-10-CM

## 2025-03-11 DIAGNOSIS — K83.8 COMMON BILE DUCT DILATION: ICD-10-CM

## 2025-03-11 DIAGNOSIS — K86.2 CYST OF PANCREAS: ICD-10-CM

## 2025-03-11 DIAGNOSIS — K86.2 PANCREATIC CYST: ICD-10-CM

## 2025-03-11 DIAGNOSIS — K83.8 OTHER SPECIFIED DISEASES OF BILIARY TRACT: ICD-10-CM

## 2025-03-11 DIAGNOSIS — K29.70 GASTRITIS: ICD-10-CM

## 2025-03-11 DIAGNOSIS — K31.89 OTHER DISEASES OF STOMACH AND DUODENUM: ICD-10-CM

## 2025-03-11 PROCEDURE — 43248 EGD GUIDE WIRE INSERTION: CPT | Performed by: INTERNAL MEDICINE

## 2025-03-11 PROCEDURE — 43259 EGD US EXAM DUODENUM/JEJUNUM: CPT | Performed by: INTERNAL MEDICINE

## 2025-03-11 PROCEDURE — 00731 ANES UPR GI NDSC PX NOS: CPT | Performed by: NURSE ANESTHETIST, CERTIFIED REGISTERED

## 2025-03-11 PROCEDURE — 43259 EGD US EXAM DUODENUM/JEJUNUM: CPT | Performed by: CLINIC/CENTER

## 2025-03-11 PROCEDURE — 43248 EGD GUIDE WIRE INSERTION: CPT | Performed by: CLINIC/CENTER

## 2025-03-11 PROCEDURE — 43239 EGD BIOPSY SINGLE/MULTIPLE: CPT | Performed by: INTERNAL MEDICINE

## 2025-03-11 PROCEDURE — 43239 EGD BIOPSY SINGLE/MULTIPLE: CPT | Performed by: CLINIC/CENTER

## 2025-03-11 PROCEDURE — 88305 TISSUE EXAM BY PATHOLOGIST: CPT | Performed by: PATHOLOGY

## 2025-03-11 RX ORDER — PANTOPRAZOLE SODIUM 40 MG/1
1 (ONE) TABLET, DELAYED RELEASE ORAL
Qty: 0 | Refills: 3 | Status: ON HOLD | COMMUNITY
Start: 2020-12-08

## 2025-03-11 RX ORDER — PANTOPRAZOLE SODIUM 40 MG/1
TABLET, DELAYED RELEASE ORAL
Status: ACTIVE | COMMUNITY

## 2025-03-11 RX ORDER — CELECOXIB 200 MG/1
TAKE 1 CAPSULE DAILY AS    NEEDED FOR PAIN CAPSULE ORAL
Qty: 90 EACH | Refills: 0 | Status: ACTIVE | COMMUNITY

## 2025-03-11 RX ORDER — ENALAPRIL MALEATE 20 MG/1
1 TABLET TABLET ORAL ONCE A DAY
Qty: 30 | Status: ACTIVE | COMMUNITY
Start: 2025-03-05

## 2025-03-11 RX ORDER — POLYETHYLENE GLYCOL 3350 17 G/DOSE
1 SCOOP MIXED WITH 8 OUNCES OF FLUID POWDER (GRAM) ORAL ONCE A DAY
Qty: 30 | Refills: 1 | Status: ACTIVE | COMMUNITY
Start: 2025-03-05 | End: 2025-05-04

## 2025-03-11 RX ORDER — DORZOLAMIDE HYDROCHLORIDE AND TIMOLOL MALEATE PRESERVATIVE FREE 20; 5 MG/ML; MG/ML
INSTILL 1 DROP INTO THE    RIGHT EYE TWICE DAILY SOLUTION/ DROPS OPHTHALMIC
Qty: 1 EACH | Refills: 0 | Status: ACTIVE | COMMUNITY

## 2025-03-27 ENCOUNTER — OFFICE VISIT (OUTPATIENT)
Dept: URBAN - METROPOLITAN AREA CLINIC 9 | Facility: CLINIC | Age: 85
End: 2025-03-27

## 2025-04-08 ENCOUNTER — CLINIC PROCEDURE ONLY (OUTPATIENT)
Age: 85
End: 2025-04-08

## 2025-04-08 DIAGNOSIS — H34.8110: ICD-10-CM

## 2025-04-08 DIAGNOSIS — H34.8322: ICD-10-CM

## 2025-04-08 DIAGNOSIS — G45.3: ICD-10-CM

## 2025-04-08 PROCEDURE — 67028 INJECTION EYE DRUG: CPT

## 2025-04-08 PROCEDURE — 92134 CPTRZ OPH DX IMG PST SGM RTA: CPT

## 2025-04-08 PROCEDURE — 92250 FUNDUS PHOTOGRAPHY W/I&R: CPT | Mod: 59

## 2025-04-08 PROCEDURE — J2777PFS VABYSMO PFS: Mod: JZ

## 2025-05-20 ENCOUNTER — CLINIC PROCEDURE ONLY (OUTPATIENT)
Age: 85
End: 2025-05-20

## 2025-05-20 DIAGNOSIS — H34.8110: ICD-10-CM

## 2025-05-20 DIAGNOSIS — G45.3: ICD-10-CM

## 2025-05-20 DIAGNOSIS — H34.8322: ICD-10-CM

## 2025-05-20 PROCEDURE — 67028 INJECTION EYE DRUG: CPT

## 2025-05-20 PROCEDURE — 92134 CPTRZ OPH DX IMG PST SGM RTA: CPT

## 2025-05-20 PROCEDURE — J2777PFS VABYSMO PFS: Mod: JZ

## 2025-05-20 PROCEDURE — 92250 FUNDUS PHOTOGRAPHY W/I&R: CPT | Mod: 59

## 2025-06-24 ENCOUNTER — CLINIC PROCEDURE ONLY (OUTPATIENT)
Age: 85
End: 2025-06-24

## 2025-06-24 DIAGNOSIS — H34.8322: ICD-10-CM

## 2025-06-24 DIAGNOSIS — H34.8110: ICD-10-CM

## 2025-06-24 DIAGNOSIS — G45.3: ICD-10-CM

## 2025-06-24 PROCEDURE — 92250 FUNDUS PHOTOGRAPHY W/I&R: CPT | Mod: 59

## 2025-06-24 PROCEDURE — J2777PFS VABYSMO PFS: Mod: JZ

## 2025-06-24 PROCEDURE — 67028 INJECTION EYE DRUG: CPT

## 2025-06-24 PROCEDURE — 92134 CPTRZ OPH DX IMG PST SGM RTA: CPT

## 2025-07-29 ENCOUNTER — CLINIC PROCEDURE ONLY (OUTPATIENT)
Age: 85
End: 2025-07-29

## 2025-07-29 DIAGNOSIS — H34.8110: ICD-10-CM

## 2025-07-29 DIAGNOSIS — G45.3: ICD-10-CM

## 2025-07-29 DIAGNOSIS — H34.8322: ICD-10-CM

## 2025-07-29 PROCEDURE — 92134 CPTRZ OPH DX IMG PST SGM RTA: CPT

## 2025-07-29 PROCEDURE — 92250 FUNDUS PHOTOGRAPHY W/I&R: CPT | Mod: 59

## 2025-07-29 PROCEDURE — 67028 INJECTION EYE DRUG: CPT

## 2025-07-29 PROCEDURE — J2777PFS VABYSMO PFS: Mod: JZ
